# Patient Record
Sex: FEMALE | Race: NATIVE HAWAIIAN OR OTHER PACIFIC ISLANDER | NOT HISPANIC OR LATINO
[De-identification: names, ages, dates, MRNs, and addresses within clinical notes are randomized per-mention and may not be internally consistent; named-entity substitution may affect disease eponyms.]

---

## 2018-06-29 ENCOUNTER — TRANSCRIPTION ENCOUNTER (OUTPATIENT)
Age: 58
End: 2018-06-29

## 2021-02-06 ENCOUNTER — TRANSCRIPTION ENCOUNTER (OUTPATIENT)
Age: 61
End: 2021-02-06

## 2021-08-28 VITALS
WEIGHT: 77.16 LBS | DIASTOLIC BLOOD PRESSURE: 74 MMHG | HEART RATE: 73 BPM | RESPIRATION RATE: 19 BRPM | OXYGEN SATURATION: 100 % | SYSTOLIC BLOOD PRESSURE: 134 MMHG | TEMPERATURE: 98 F | HEIGHT: 60 IN

## 2021-08-28 LAB
ALBUMIN SERPL ELPH-MCNC: 4.4 G/DL — SIGNIFICANT CHANGE UP (ref 3.3–5)
ALP SERPL-CCNC: 35 U/L — LOW (ref 40–120)
ALT FLD-CCNC: 11 U/L — SIGNIFICANT CHANGE UP (ref 10–45)
ANION GAP SERPL CALC-SCNC: 7 MMOL/L — SIGNIFICANT CHANGE UP (ref 5–17)
APPEARANCE UR: CLEAR — SIGNIFICANT CHANGE UP
AST SERPL-CCNC: 17 U/L — SIGNIFICANT CHANGE UP (ref 10–40)
BACTERIA # UR AUTO: PRESENT /HPF
BASOPHILS # BLD AUTO: 0.06 K/UL — SIGNIFICANT CHANGE UP (ref 0–0.2)
BASOPHILS NFR BLD AUTO: 0.9 % — SIGNIFICANT CHANGE UP (ref 0–2)
BILIRUB SERPL-MCNC: 0.2 MG/DL — SIGNIFICANT CHANGE UP (ref 0.2–1.2)
BILIRUB UR-MCNC: NEGATIVE — SIGNIFICANT CHANGE UP
BUN SERPL-MCNC: 29 MG/DL — HIGH (ref 7–23)
CALCIUM SERPL-MCNC: 9.6 MG/DL — SIGNIFICANT CHANGE UP (ref 8.4–10.5)
CHLORIDE SERPL-SCNC: 106 MMOL/L — SIGNIFICANT CHANGE UP (ref 96–108)
CO2 SERPL-SCNC: 29 MMOL/L — SIGNIFICANT CHANGE UP (ref 22–31)
COD CRY URNS QL: ABNORMAL /HPF
COLOR SPEC: YELLOW — SIGNIFICANT CHANGE UP
CREAT SERPL-MCNC: 0.86 MG/DL — SIGNIFICANT CHANGE UP (ref 0.5–1.3)
DIFF PNL FLD: ABNORMAL
EOSINOPHIL # BLD AUTO: 0.16 K/UL — SIGNIFICANT CHANGE UP (ref 0–0.5)
EOSINOPHIL NFR BLD AUTO: 2.5 % — SIGNIFICANT CHANGE UP (ref 0–6)
EPI CELLS # UR: SIGNIFICANT CHANGE UP /HPF (ref 0–5)
ETHANOL SERPL-MCNC: <10 MG/DL — SIGNIFICANT CHANGE UP (ref 0–10)
GLUCOSE SERPL-MCNC: 108 MG/DL — HIGH (ref 70–99)
GLUCOSE UR QL: NEGATIVE — SIGNIFICANT CHANGE UP
HCT VFR BLD CALC: 39.4 % — SIGNIFICANT CHANGE UP (ref 34.5–45)
HGB BLD-MCNC: 12.8 G/DL — SIGNIFICANT CHANGE UP (ref 11.5–15.5)
IMM GRANULOCYTES NFR BLD AUTO: 0.3 % — SIGNIFICANT CHANGE UP (ref 0–1.5)
KETONES UR-MCNC: NEGATIVE — SIGNIFICANT CHANGE UP
LEUKOCYTE ESTERASE UR-ACNC: NEGATIVE — SIGNIFICANT CHANGE UP
LYMPHOCYTES # BLD AUTO: 2.06 K/UL — SIGNIFICANT CHANGE UP (ref 1–3.3)
LYMPHOCYTES # BLD AUTO: 31.9 % — SIGNIFICANT CHANGE UP (ref 13–44)
MCHC RBC-ENTMCNC: 31.4 PG — SIGNIFICANT CHANGE UP (ref 27–34)
MCHC RBC-ENTMCNC: 32.5 GM/DL — SIGNIFICANT CHANGE UP (ref 32–36)
MCV RBC AUTO: 96.8 FL — SIGNIFICANT CHANGE UP (ref 80–100)
MONOCYTES # BLD AUTO: 0.58 K/UL — SIGNIFICANT CHANGE UP (ref 0–0.9)
MONOCYTES NFR BLD AUTO: 9 % — SIGNIFICANT CHANGE UP (ref 2–14)
NEUTROPHILS # BLD AUTO: 3.57 K/UL — SIGNIFICANT CHANGE UP (ref 1.8–7.4)
NEUTROPHILS NFR BLD AUTO: 55.4 % — SIGNIFICANT CHANGE UP (ref 43–77)
NITRITE UR-MCNC: NEGATIVE — SIGNIFICANT CHANGE UP
NRBC # BLD: 0 /100 WBCS — SIGNIFICANT CHANGE UP (ref 0–0)
PH UR: 6.5 — SIGNIFICANT CHANGE UP (ref 5–8)
PLATELET # BLD AUTO: 293 K/UL — SIGNIFICANT CHANGE UP (ref 150–400)
POTASSIUM SERPL-MCNC: 4.3 MMOL/L — SIGNIFICANT CHANGE UP (ref 3.5–5.3)
POTASSIUM SERPL-SCNC: 4.3 MMOL/L — SIGNIFICANT CHANGE UP (ref 3.5–5.3)
PROT SERPL-MCNC: 6.6 G/DL — SIGNIFICANT CHANGE UP (ref 6–8.3)
PROT UR-MCNC: NEGATIVE MG/DL — SIGNIFICANT CHANGE UP
RBC # BLD: 4.07 M/UL — SIGNIFICANT CHANGE UP (ref 3.8–5.2)
RBC # FLD: 12.8 % — SIGNIFICANT CHANGE UP (ref 10.3–14.5)
RBC CASTS # UR COMP ASSIST: ABNORMAL /HPF
SODIUM SERPL-SCNC: 142 MMOL/L — SIGNIFICANT CHANGE UP (ref 135–145)
SP GR SPEC: 1.02 — SIGNIFICANT CHANGE UP (ref 1–1.03)
UROBILINOGEN FLD QL: 0.2 E.U./DL — SIGNIFICANT CHANGE UP
WBC # BLD: 6.45 K/UL — SIGNIFICANT CHANGE UP (ref 3.8–10.5)
WBC # FLD AUTO: 6.45 K/UL — SIGNIFICANT CHANGE UP (ref 3.8–10.5)
WBC UR QL: < 5 /HPF — SIGNIFICANT CHANGE UP

## 2021-08-28 PROCEDURE — 93010 ELECTROCARDIOGRAM REPORT: CPT | Mod: 59

## 2021-08-28 PROCEDURE — 99285 EMERGENCY DEPT VISIT HI MDM: CPT

## 2021-08-28 NOTE — ED ADULT TRIAGE NOTE - OTHER COMPLAINTS
CC of SI no specific plans denies HI x 2 mos gradually getting worst. no attempts in the past, sent by a Ashwini Sheridan

## 2021-08-28 NOTE — ED PROVIDER NOTE - CLINICAL SUMMARY MEDICAL DECISION MAKING FREE TEXT BOX
Pt is afebrile and hemodynamically stable. She has no medical complaints. Accompanied to ED by friend april who was concerned by severity of pt's depression. Pt reports passive SI, denies plan. Labs unremarkable. UDS and ETOH negative. COVID19 neg. Telepsych consulted and evaluated pt. Pt initially agreed to voluntary admit; however, when asked to complete paperwork, was hesitant to do so. Updated Telepsych who states they will obtain collateral from pt's friends/family in AM prior to final dispo. Pt is afebrile and hemodynamically stable. She has no medical complaints. Accompanied to ED by friend april who was concerned by severity of pt's depression. Pt reports passive SI, denies plan. Labs unremarkable. UDS and ETOH negative. COVID19 neg. Telepsych consulted and evaluated pt. Pt initially agreed to voluntary admit; however, when asked to complete paperwork, was hesitant to do so. Updated Telepsych who states they will obtain collateral from pt's friends/family in AM prior to final dispo.      Patient calm and cooperative. admitted for further psych management

## 2021-08-28 NOTE — ED PROVIDER NOTE - PROGRESS NOTE DETAILS
Renee - Pt requesting to hold off on voluntary admission paperwork. States she is not sure she wants admission. She is concerned about insurance coverage. Discussed this with tele psych who states pt should be placed on psych hold and psych to obtain collateral in am. Director - pt received from night team.  Pt resting comfortably, awaits psych re-eval this am.  VS, labs reviewed.  Pt medically clear for psych admit. Director - psych contacted - awaiting return call to discuss w pt's outpt psych; cont to await dispo.

## 2021-08-28 NOTE — ED ADULT NURSE NOTE - OBJECTIVE STATEMENT
Patient sent to the ED for evaluation for depression, SI, denies suicidal attempts, or plan, Or HI, patient endorsed that she has been having worsening depression, SI and has became non functional over the past 2 YRS.  Patient is alert and oriented, calm, placed on CO, undressed and wanded for safety.

## 2021-08-28 NOTE — ED ADULT NURSE NOTE - NSIMPLEMENTINTERV_GEN_ALL_ED
Implemented All Universal Safety Interventions:  [place] to call system. Call bell, personal items and telephone within reach. Instruct patient to call for assistance. Room bathroom lighting operational. Non-slip footwear when patient is off stretcher. Physically safe environment: no spills, clutter or unnecessary equipment. Stretcher in lowest position, wheels locked, appropriate side rails in place.

## 2021-08-28 NOTE — ED PROVIDER NOTE - TEMPLATE
Psych/Behavioral I have reviewed and confirmed nurses' notes for patient's medications, allergies, medical history, and surgical history.

## 2021-08-28 NOTE — ED PROVIDER NOTE - ATTENDING CONTRIBUTION TO CARE
62 y/o F pt w/ PMHx of anxiety and depression, takes Latuda and lamotrigine, presents to the ED with worsening depressive symptoms and passive suicidal thoughts over the past 2 weeks. States that she has been depressed for many years. For the past few months, symptoms have been worsening. Reports that 3 weeks ago that she was taken off lithium. States that over the past 2 weeks, she has had worsening anhedonia, decreased appetite, and withdrawn from friends and family. Admits to passive suicidal thoughts without a plan. Reports depression and suicidal thoughts in the context of financial stress. Denies homicidal ideations, auditory hallucinations, and drug and alcohol use. Patient lives alone and was brought into the ED by a friend Candy Ty ([phone]) who is concerned for her. Psychiatrist is Dr. Hedrick. Denies medical complaints. Fully vaccinated against COVID-19. No physical complaints  Limited PE performed in the setting of the COVID10 pandemic, in efforts to limit exposure and cross-contamination  Constitutional: Well appearing, awake, alert, oriented to person, place, time/situation and in no apparent distress.  ENMT: Airway patent.   Cardiac: Normal rate, regular rhythm.   Respiratory: No increased WOB, tachypnea, hypoxia, or accessory mm use. Pt speaks in full sentences.    Musculoskeletal: Range of motion is not limited  Neuro: Alert and oriented x 3, face symmetric and speech fluent. Nml gross motor movement, grossly non focal   Skin: Skin normal color for race, warm, dry and intact. No evidence of rash.  Psych: Alert and oriented to person, place, time/situation. cooperative, flat affect, int tearful, maintained on 1:1  EKG NSR @ 61 bpm, nml axis, nml intervals. no ST_T abn. QTc 398  SI / depression w/o medical complaints. 1:1 psych med clearance w/u, psych eval  Pt initially for voluntary admission, but hesitates 2/2 costs. Re-discussed w/ psych. Hold to AM for re-eval and when able to obtain further collateral info  Pt s/o night team IMTIAZ Duran / Carla pending psych dispo

## 2021-08-28 NOTE — ED PROVIDER NOTE - PHYSICAL EXAMINATION
VITAL SIGNS: I have reviewed nursing notes and confirm.  CONSTITUTIONAL: Thin appearing and in no acute distress.   SKIN:  warm and dry, no acute rash.   HEAD:  normocephalic, atraumatic.  EYES: PERRL, EOM intact; conjunctiva and sclera clear.  ENT: No nasal discharge; airway clear.   NECK: Supple; non tender.  CARD: S1, S2 normal; no murmurs, gallops, or rubs. Regular rate and rhythm.   RESP:  Clear to auscultation b/l, no wheezes, rales or rhonchi.  ABD: Normal bowel sounds; soft; non-distended; non-tender; no guarding/ rebound.  EXT: Normal ROM. No clubbing, cyanosis or edema. 2+ pulses to b/l ue/le.  NEURO: Alert, oriented, grossly unremarkable  PSYCH: Cooperative, flat affect, intermittently tearful.

## 2021-08-28 NOTE — ED PROVIDER NOTE - NS ED ROS FT
Constitutional: No fever. No chills.  Eyes: No redness. No discharge. No vision change.   ENT: No sore throat. No ear pain.  Cardiovascular: No chest pain. No leg swelling.  Respiratory: No cough. No shortness of breath.  GI: No abdominal pain. No vomiting. No diarrhea.   MSK: No joint pain. No back pain.   Skin: No rash. No abrasions.   Neuro: No numbness. No weakness.   Psych: Suicidal thoughts and depressive symptoms. No homicidal ideations, and no auditory hallucinations.

## 2021-08-28 NOTE — ED PROVIDER NOTE - OBJECTIVE STATEMENT
60 y/o F pt w/ PMHx of anxiety and depression, takes Latuda and lamotrigine, presents to the ED with worsening depressive symptoms and passive suicidal thoughts over the past 2 weeks. States that she has been depressed for many years. For the past few months, symptoms have been worsening. Reports that 3 weeks ago that she was taken off lithium. States that over the past 2 weeks, she has had worsening anhedonia, decreased appetite, and withdrawn from friends and family. Admits to passive suicidal thoughts without a plan. Reports depression and suicidal thoughts in the context of financial stress. Denies homicidal ideations, auditory hallucinations, and drug and alcohol use. Patient lives alone and was brought into the ED by a friend who is concerned for her. Psychiatrist is Dr. Hedrick. Denies medical complaints. Fully vaccinated against COVID-19. 60 y/o F pt w/ PMHx of anxiety and depression, takes Latuda and lamotrigine, presents to the ED with worsening depressive symptoms and passive suicidal thoughts over the past 2 weeks. States that she has been depressed for many years. For the past few months, symptoms have been worsening. Reports that 3 weeks ago that she was taken off lithium. States that over the past 2 weeks, she has had worsening anhedonia, decreased appetite, and withdrawn from friends and family. Admits to passive suicidal thoughts without a plan. Reports depression and suicidal thoughts in the context of financial stress. Denies homicidal ideations, auditory hallucinations, and drug and alcohol use. Patient lives alone and was brought into the ED by a friend Candy Ty ([phone]) who is concerned for her. Psychiatrist is Dr. Hedrick. Denies medical complaints. Fully vaccinated against COVID-19.

## 2021-08-29 ENCOUNTER — INPATIENT (INPATIENT)
Facility: HOSPITAL | Age: 61
LOS: 10 days | Discharge: HOME | DRG: 885 | End: 2021-09-09
Attending: PSYCHIATRY & NEUROLOGY | Admitting: PSYCHIATRY & NEUROLOGY
Payer: COMMERCIAL

## 2021-08-29 DIAGNOSIS — F33.2 MAJOR DEPRESSIVE DISORDER, RECURRENT SEVERE WITHOUT PSYCHOTIC FEATURES: ICD-10-CM

## 2021-08-29 LAB
AMPHET UR-MCNC: NEGATIVE — SIGNIFICANT CHANGE UP
BARBITURATES UR SCN-MCNC: NEGATIVE — SIGNIFICANT CHANGE UP
BENZODIAZ UR-MCNC: NEGATIVE — SIGNIFICANT CHANGE UP
COCAINE METAB.OTHER UR-MCNC: NEGATIVE — SIGNIFICANT CHANGE UP
METHADONE UR-MCNC: NEGATIVE — SIGNIFICANT CHANGE UP
OPIATES UR-MCNC: NEGATIVE — SIGNIFICANT CHANGE UP
PCP SPEC-MCNC: SIGNIFICANT CHANGE UP
PCP UR-MCNC: NEGATIVE — SIGNIFICANT CHANGE UP
SARS-COV-2 RNA SPEC QL NAA+PROBE: SIGNIFICANT CHANGE UP
THC UR QL: NEGATIVE — SIGNIFICANT CHANGE UP

## 2021-08-29 PROCEDURE — 90792 PSYCH DIAG EVAL W/MED SRVCS: CPT

## 2021-08-29 PROCEDURE — 90792 PSYCH DIAG EVAL W/MED SRVCS: CPT | Mod: 95

## 2021-08-29 RX ORDER — LURASIDONE HYDROCHLORIDE 40 MG/1
60 TABLET ORAL AT BEDTIME
Refills: 0 | Status: DISCONTINUED | OUTPATIENT
Start: 2021-08-29 | End: 2021-08-29

## 2021-08-29 RX ORDER — LAMOTRIGINE 25 MG/1
200 TABLET, ORALLY DISINTEGRATING ORAL ONCE
Refills: 0 | Status: DISCONTINUED | OUTPATIENT
Start: 2021-08-29 | End: 2021-08-29

## 2021-08-29 RX ORDER — LANOLIN ALCOHOL/MO/W.PET/CERES
3 CREAM (GRAM) TOPICAL AT BEDTIME
Refills: 0 | Status: DISCONTINUED | OUTPATIENT
Start: 2021-08-29 | End: 2021-09-09

## 2021-08-29 RX ORDER — LURASIDONE HYDROCHLORIDE 40 MG/1
60 TABLET ORAL EVERY 24 HOURS
Refills: 0 | Status: DISCONTINUED | OUTPATIENT
Start: 2021-08-29 | End: 2021-08-30

## 2021-08-29 RX ORDER — ACETAMINOPHEN 500 MG
650 TABLET ORAL EVERY 6 HOURS
Refills: 0 | Status: DISCONTINUED | OUTPATIENT
Start: 2021-08-29 | End: 2021-09-09

## 2021-08-29 RX ORDER — LAMOTRIGINE 25 MG/1
100 TABLET, ORALLY DISINTEGRATING ORAL EVERY 12 HOURS
Refills: 0 | Status: DISCONTINUED | OUTPATIENT
Start: 2021-08-29 | End: 2021-09-09

## 2021-08-29 RX ORDER — PETROLATUM,WHITE
1 JELLY (GRAM) TOPICAL ONCE
Refills: 0 | Status: COMPLETED | OUTPATIENT
Start: 2021-08-29 | End: 2021-08-29

## 2021-08-29 RX ORDER — LAMOTRIGINE 25 MG/1
100 TABLET, ORALLY DISINTEGRATING ORAL ONCE
Refills: 0 | Status: COMPLETED | OUTPATIENT
Start: 2021-08-29 | End: 2021-08-29

## 2021-08-29 RX ORDER — QUETIAPINE FUMARATE 200 MG/1
25 TABLET, FILM COATED ORAL EVERY 6 HOURS
Refills: 0 | Status: DISCONTINUED | OUTPATIENT
Start: 2021-08-29 | End: 2021-09-09

## 2021-08-29 RX ORDER — HYDROXYZINE HCL 10 MG
50 TABLET ORAL EVERY 24 HOURS
Refills: 0 | Status: DISCONTINUED | OUTPATIENT
Start: 2021-08-29 | End: 2021-08-30

## 2021-08-29 RX ADMIN — Medication 0.5 MILLIGRAM(S): at 03:11

## 2021-08-29 RX ADMIN — LAMOTRIGINE 100 MILLIGRAM(S): 25 TABLET, ORALLY DISINTEGRATING ORAL at 14:09

## 2021-08-29 RX ADMIN — Medication 3 MILLIGRAM(S): at 21:41

## 2021-08-29 RX ADMIN — Medication 1 APPLICATION(S): at 02:01

## 2021-08-29 RX ADMIN — LAMOTRIGINE 100 MILLIGRAM(S): 25 TABLET, ORALLY DISINTEGRATING ORAL at 21:41

## 2021-08-29 NOTE — ED BEHAVIORAL HEALTH ASSESSMENT NOTE - OTHER PAST PSYCHIATRIC HISTORY (INCLUDE DETAILS REGARDING ONSET, COURSE OF ILLNESS, INPATIENT/OUTPATIENT TREATMENT)
No suicide attempts or hospitalizatoins  States that she has had a couple of impulsive periods in her life so while she denies jay jay manic or hypomanic episodes one of her doctors felt that she would better be treated as bipolar depression. States that Dr. Joshi started her on lithium and she had a consultation with Dr. Vivian Pierre at [facility] who started her on latuda

## 2021-08-29 NOTE — ED BEHAVIORAL HEALTH ASSESSMENT NOTE - RISK ASSESSMENT
Risk factors include worsening suicide ideation, ongoing stressor, hopelessness, anhedonia, recent change in medications, social isolation. Protective factors include lack of prior suicide attempts, lack of access to firearms, lack of psychosis, sobriety, residential stability, treatment seeking behavior, supportive friend High Acute Suicide Risk

## 2021-08-29 NOTE — ED BEHAVIORAL HEALTH ASSESSMENT NOTE - PSYCHIATRIC ISSUES AND PLAN (INCLUDE STANDING AND PRN MEDICATION)
Continue latuda 60mg PO qHS and lamictal 100mg PO BID. Obtain collateral from psychiatrist Dr. Foster at [phone] and Dr. Joshi at [phone]. Obtain collateral from friend April. For anxiety, ativan 0.5mg PO q4h PRN. For agitation, haldol 2mg with ativan 1mg and cogentin 0.5mg PO or IM if severe

## 2021-08-29 NOTE — ED BEHAVIORAL HEALTH NOTE - BEHAVIORAL HEALTH NOTE
PROGRESS NOTE    ID: This is a 61 year-old female, domiciled in [facility], single, no dependents, has pet (with friend), previously worked at art gallery, unemployed and various financial stressors currently, PPHx of depression (bipolar versus unipolar), multiple antidepressant trials and more recently on lamotrigine 100mg bid, lurasidone 60mg qhs, and Lithium 300mg for the past 6 months, Lithium dc’ed 3 weeks ago (although pt and psychiatrist note minimal benefit with Lithium for suicidality or otherwise), no previous psychiatric hospitalizations, engaged in outpatient care (DBT individual and group, psychiatrist for med management (new and seeing Dr. Foster since May 2021), h/o ketamine treatments with reportedly no benefit, no pSA, h/o intermittent passive suicidality, family h/o suspected completed suicide in great grandfather, no known family h/o BPAD, SCZ,  no h/o aggression/violence, no legal history, denies substance use hx but friend reports remote h/o alcohol use, no current substance use and no c/f acute etoh/benzo wd, no PMH, who was BIBS and friend for worsening depression, decline in functioning, and passive SI in context of various psychosocial stressors including finances.     Interval Events:  On reassessment, patient is slowed, withdrawn, and presents depressed with indecisiveness and difficulty concentrating. Patient endorses passive SI and notes that she has struggled to function with difficulty completing daily tasks, difficulty concentrating, anergia, amotivation, anhedonia, and increased passive suicidality (no thoughts of plan or h/o pSA). Patient attributes worsening depression to financial stressors and feeling isolated with no family and few friends. Patient shares h/o depressive symptoms and multiple medication trials and ketamine treatments in the past with limited benefit. Previous therapist Dr. Joshi believed patient to do better on Lithium 300mg qhs (in combo with current regimen of lamotrigine 100mg bid and lurasidone 60mg qhs) but patient and current psychiatrist Dr. Foster report limited benefit and progressive decline in mood and functioning even when taking low-dose Lithium (dc’ed 3 weeks ago). Previous therapist believed patient to struggle with bipolar depression (h/o impulsivity and increased spending briefly) but no distinct manic or hypomanic episodes and past history concerning for borderline personality disorder. Patient had difficulty affording care with therapist Dr. Johsi and transitioned to new program for DBT (individual and group) and patient notes difficulty engaging in these virtual therapies due to depressive symptoms. Current psychiatrist is concerned for superimposed depression on BPD but notes patient is new to her (followed since 5/2021) and is “barely functioning at all.”     With further discussion, patient interested in voluntary psychiatric admission with insight into worsening depressive symptoms and decline in functioning. Patient completed legals and discussed continuing lamotrigine and lurasidone at this time and potential to consider different agent (?SSRI/SNRI to target possible unipolar depression) superimposed on BPD. r/o mood disorder with psychoses (patient perseverative around finances but no overt delusional content elicited on reassessment).    Vitals: wnl  MSE:   General: ASA, thin  woman sitting upright in hospital bed in ED anxious appearing  Speech: RRVP   Mood: "terrible"  Affect: constricted  TP: linear  TC: no overt delusions   Perception: denies AVH   SI/HI: endorses passive SI, denies HI  I/J: poor/poor  Cognition: AAOx3, no addn'l assessment    Labs: unremarkable    A/P:      Collateral Contacts:  -psychiatrist Dr. Foster #[phone], mobile#[phone]  -recent therapist   -current DBT therapist Dr. Glover PROGRESS NOTE    ID: This is a 61 year-old female, domiciled in [facility], single, no dependents, has pet (with friend), previously worked at art gallery, unemployed and various financial stressors currently, PPHx of depression (bipolar versus unipolar), multiple antidepressant trials and more recently on lamotrigine 100mg bid, lurasidone 60mg qhs, and Lithium 300mg for the past 6 months, Lithium dc’ed 3 weeks ago (although pt and psychiatrist note minimal benefit with Lithium for suicidality or otherwise), no previous psychiatric hospitalizations, engaged in outpatient care (DBT individual and group, psychiatrist for med management (new and seeing Dr. Foster since May 2021), h/o ketamine treatments with reportedly no benefit, no pSA, h/o intermittent passive suicidality, family h/o suspected completed suicide in great grandfather, no known family h/o BPAD, SCZ,  no h/o aggression/violence, no legal history, denies substance use hx but friend reports remote h/o alcohol use, no current substance use and no c/f acute etoh/benzo wd, no PMH, who was BIBS and friend for worsening depression, decline in functioning, and passive SI in context of various psychosocial stressors including finances.     Interval Events:  On reassessment, patient is slowed, withdrawn, and presents depressed with indecisiveness and difficulty concentrating. Patient endorses passive SI and notes that she has struggled to function with difficulty completing daily tasks, difficulty concentrating, anergia, amotivation, anhedonia, and increased passive suicidality (no thoughts of plan or h/o pSA). Patient attributes worsening depression to financial stressors and feeling isolated with no family and few friends. Patient shares h/o depressive symptoms and multiple medication trials and ketamine treatments in the past with limited benefit. Previous therapist Dr. Joshi believed patient to do better on Lithium 300mg qhs (in combo with current regimen of lamotrigine 100mg bid and lurasidone 60mg qhs) but patient and current psychiatrist Dr. Foster report limited benefit and progressive decline in mood and functioning even when taking low-dose Lithium (dc’ed 3 weeks ago). Previous therapist believed patient to struggle with bipolar depression (h/o impulsivity and increased spending briefly) but no distinct manic or hypomanic episodes and past history concerning for borderline personality disorder. Patient had difficulty affording care with therapist Dr. Joshi and transitioned to new program for DBT (individual and group) and patient notes difficulty engaging in these virtual therapies due to depressive symptoms. Current psychiatrist is concerned for superimposed depression on BPD but notes patient is new to her (followed since 5/2021) and is “barely functioning at all.”     With further discussion, patient interested in voluntary psychiatric admission with insight into worsening depressive symptoms and decline in functioning. Patient completed legals and discussed continuing lamotrigine and lurasidone at this time and potential to consider different agent (?SSRI/SNRI to target possible unipolar depression) superimposed on BPD. r/o mood disorder with psychoses (patient perseverative around finances but no overt delusional content elicited on reassessment).    Vitals: wnl  MSE:   General: ASA, thin  woman sitting upright in hospital bed in ED anxious appearing  Speech: RRVP   Mood: "terrible"  Affect: constricted  TP: linear  TC: no overt delusions   Perception: denies AVH   SI/HI: endorses passive SI, denies HI  I/J: poor/poor  Cognition: AAOx3, no addn'l assessment    Labs: unremarkable    A/P: This is a 61 year-old domiciled female who is currently unemployed with financial stressors and limited social supports who has past psychiatric history of suspected borderline personality disorder, bipolar versus unipolar depression, no previous psychiatric hospitalizations, no history of suicide attempts but h/o passive SI often in context of acute stressors, h/o impulsivity (not clearly described as within distinct hypomanic episode) no h/o distinct acute jeanette or psychoses, no known family h/o BPAD, suspected completed suicide in great grandfather, who presented to ER with friend in context of worsening depressive symptoms and passive SI. Outside treaters are concerned and both share history of patient declining with worsening depressive symptoms (low mood, recently decreased appetite, weight loss, anergia, difficulty concentrating, amotivation, anhedonia, hopelessness and increased thoughts of passive SI ("well if I cannot resolve my financial issues, then I can always just kill myself...I mean...this is not getting any better."). Initial impression is patient has superimposed depression on borderline personality disorder based on history from therapists but unclear if unipolar versus bipolar. Patient worsened while on lamotrigine, lurasidone, and Lithium (300mg) which may suggest undertreated unipolar major depressive episode although may also be that patient requires higher dosing of Lithium for mood stabilization. Patient perseverative about finances and has difficulty organizing her thoughts (indecisiveness i/c/o depression) and no overt delusional content elicited on evaluation but r/o psychotic features on further diagnostic evaluation. Previous SSRI/SNRI and mood stabilizer trials reportedly ineffective but may have been when BPD pathology more prominent and no MDE during those trials. Admit to inpatient psychiatric unit for stabilization, no 1:1 indicated while on unit. Gather collateral around previous medication trials and reassess current medication regimen and if an SSRI trial warranted/potentially beneficial (versus higher dosing of Lithium to target mood stability).     PLAN:  -vol admit  -lamotrigine 200mg qd  -lurasidone 60mg qhs  -expand collateral and hx re. previous med regimens    Collateral Contacts:  -psychiatrist Dr. Foster #[phone], mobile#[phone]  -recent therapist Dr. Joshi [phone]  -current DBT therapist Dr. Yefri Glover  -friend (BIB this friend to ER and collateral in previous note) April Ty [phone] PROGRESS NOTE    ID: This is a 61 year-old female, domiciled in [facility], single, no dependents, has pet (with friend), previously worked at art gallery, unemployed and various financial stressors currently, PPHx of depression (bipolar versus unipolar), multiple antidepressant trials and more recently on lamotrigine 100mg bid, lurasidone 60mg qhs, and Lithium 300mg for the past 6 months, Lithium dc’ed 3 weeks ago (although pt and psychiatrist note minimal benefit with Lithium for suicidality or otherwise), no previous psychiatric hospitalizations, engaged in outpatient care (DBT individual and group, psychiatrist for med management (new and seeing Dr. Foster since May 2021), h/o ketamine treatments with reportedly no benefit, no pSA, h/o intermittent passive suicidality, family h/o suspected completed suicide in great grandfather, no known family h/o BPAD, SCZ,  no h/o aggression/violence, no legal history, denies substance use hx but friend reports remote h/o alcohol use, no current substance use and no c/f acute etoh/benzo wd, no PMH, who was BIBS and friend for worsening depression, decline in functioning, and passive SI in context of various psychosocial stressors including finances.     Interval Events:  On reassessment, patient is slowed, withdrawn, and presents depressed with indecisiveness and difficulty concentrating. Patient endorses passive SI and notes that she has struggled to function with difficulty completing daily tasks, difficulty concentrating, anergia, amotivation, anhedonia, and increased passive suicidality (no thoughts of plan or h/o pSA). Patient attributes worsening depression to financial stressors and feeling isolated with no family and few friends. Patient shares h/o depressive symptoms and multiple medication trials and ketamine treatments in the past with limited benefit. Previous therapist Dr. Joshi believed patient to do better on Lithium 300mg qhs (in combo with current regimen of lamotrigine 100mg bid and lurasidone 60mg qhs) but patient and current psychiatrist Dr. Foster report limited benefit and progressive decline in mood and functioning even when taking low-dose Lithium (dc’ed 3 weeks ago). Previous therapist believed patient to struggle with bipolar depression (h/o impulsivity and increased spending briefly) but no distinct manic or hypomanic episodes and past history concerning for borderline personality disorder. Patient had difficulty affording care with therapist Dr. Joshi and transitioned to new program for DBT (individual and group) and patient notes difficulty engaging in these virtual therapies due to depressive symptoms. Current psychiatrist is concerned for superimposed depression on BPD but notes patient is new to her (followed since 5/2021) and is “barely functioning at all.”     With further discussion, patient interested in voluntary psychiatric admission with insight into worsening depressive symptoms and decline in functioning. Patient completed legals and discussed continuing lamotrigine and lurasidone at this time and potential to consider different agent (?SSRI/SNRI to target possible unipolar depression) superimposed on BPD. r/o mood disorder with psychoses (patient perseverative around finances but no overt delusional content elicited on reassessment).    Vitals: wnl  MSE:   General: ASA, thin  woman sitting upright in hospital bed in ED anxious appearing  Speech: RRVP   Mood: "terrible"  Affect: constricted  TP: linear  TC: no overt delusions   Perception: denies AVH   SI/HI: endorses passive SI, denies HI  I/J: poor/poor  Cognition: AAOx3, no addn'l assessment    Labs: unremarkable    A/P: This is a 61 year-old domiciled female who is currently unemployed with financial stressors and limited social supports who has past psychiatric history of suspected borderline personality disorder, bipolar versus unipolar depression, no previous psychiatric hospitalizations, no history of suicide attempts but h/o passive SI often in context of acute stressors, h/o impulsivity (not clearly described as within distinct hypomanic episode) no h/o distinct acute jeanette or psychoses, no known family h/o BPAD, suspected completed suicide in great grandfather, who presented to ER with friend in context of worsening depressive symptoms and passive SI. Outside treaters are concerned and both share history of patient declining with worsening depressive symptoms (low mood, recently decreased appetite, weight loss, anergia, difficulty concentrating, amotivation, anhedonia, hopelessness and increased thoughts of passive SI ("well if I cannot resolve my financial issues, then I can always just kill myself...I mean...this is not getting any better."). Initial impression is patient has superimposed depression on borderline personality disorder based on history from therapists but unclear if unipolar versus bipolar. Patient worsened while on lamotrigine, lurasidone, and Lithium (300mg) which may suggest undertreated unipolar major depressive episode although may also be that patient requires higher dosing of Lithium for mood stabilization. Patient perseverative about finances and has difficulty organizing her thoughts (indecisiveness i/c/o depression) and no overt delusional content elicited on evaluation but r/o psychotic features on further diagnostic evaluation. Previous SSRI/SNRI and mood stabilizer trials reportedly ineffective but may have been when BPD pathology more prominent and no MDE during those trials. Admit to inpatient psychiatric unit for stabilization, no 1:1 indicated while on unit. Gather collateral around previous medication trials and reassess current medication regimen and if an SSRI trial warranted/potentially beneficial (versus higher dosing of Lithium to target mood stability).     PLAN:  -vol admit  -lamotrigine 200mg qd  -lurasidone 60mg qhs  -for agitation/restlessness: PRN quetiapine 25mg q6h and lorazepam 1mg q6h (limit lorazepam dosing if requiring given AEs in elderly pt   -PRN melatonin for insomnia  -PRN hydroxyzine 50mg qd for anxiety, limit dosing given anticholinergic effects in elderly pt   -expand collateral and hx re. previous med regimens    Collateral Contacts:  -psychiatrist Dr. Foster #[phone], mobile#[phone]  -recent therapist Dr. Joshi [phone]  -current DBT therapist Dr. Yefri Glover  -friend (BIB this friend to ER and collateral in previous note) April Ty [phone]

## 2021-08-29 NOTE — ED BEHAVIORAL HEALTH ASSESSMENT NOTE - HPI (INCLUDE ILLNESS QUALITY, SEVERITY, DURATION, TIMING, CONTEXT, MODIFYING FACTORS, ASSOCIATED SIGNS AND SYMPTOMS)
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 She reports low mood, inability to enjoy herself, cannot even bring herself to watch tv, feeling increasingly hopeless about her financial situation, with low energy, 7lb weight loss in the last 3 weeks (is 4'11", 77lbs currently, BMI 15.6), increasing isolation, withdrawal from any social contacts, and worsening suicide ideation stating that if she cannot figure out her financial situation she will have to kill herself. She states that she has had some thoughts about how she would do this which come into her mind briefly but she did not clarify and states that she is too coward to try, but notes that she has been thinking more about them. She states that generally sleep has been okay though she did not sleep last night. She states that she had switched from her longer term psychiatrist/therapist Dr. Joshi to a DBT therapist/psychiatrist Dr. Foster. She states that she had been taking latuda, lamictal, and lithium, but felt overmedicated and told Dr. Foster and stopped the lithium several weeks ago. She states that she spoke to Dr. Joshi recently who recommended restarting lithium at a lower dose, but she did this once and felt too sedated so she stopped. She states that last night she forgot to take her latuda so she took it in the morning, and stated that today she felt catatonic, in that she went to [facility] with her friend and could barely engage, did not feel present, could barely think, and so friend brought her to the ED. She states that her therapist has been suggesting hospitalization for some time, as has her friend, but patient states that she has resisted because she is scared and it is embarassing, though she acknowledges that her symptoms are all worsening and outpatient treatment has not worked, so she is open to coming into the hospital at this point. 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 She reports low mood, inability to enjoy herself, cannot even bring herself to watch tv, feeling increasingly hopeless about her financial situation, with low energy, 7lb weight loss in the last 3 weeks (is 4'11", 77lbs currently, BMI 15.6), increasing isolation, withdrawal from any social contacts, and worsening suicide ideation stating that if she cannot figure out her financial situation she will have to kill herself. She states that she has had some thoughts about how she would do this which come into her mind briefly but she did not clarify and states that she is too coward to try, but notes that she has been thinking more about them. She states that generally sleep has been okay though she did not sleep last night. She states that she had switched from her longer term psychiatrist/therapist Dr. Joshi to a DBT therapist/psychiatrist Dr. Foster. She states that she had been taking latuda, lamictal, and lithium, but felt overmedicated and told Dr. Foster and stopped the lithium several weeks ago. She states that she spoke to Dr. Joshi recently who recommended restarting lithium at a lower dose, but she did this once and felt too sedated so she stopped. She states that last night she forgot to take her latuda so she took it in the morning, and stated that today she felt catatonic, in that she went to [facility] with her friend and could barely engage, did not feel present, could barely think, and so friend brought her to the ED. She states that her therapist has been suggesting hospitalization for some time, as has her friend, but patient states that she has resisted because she is scared and it is embarassing, though she acknowledges that her symptoms are all worsening and outpatient treatment has not worked, so she is open to coming into the hospital at this point.    Regarding her weight, she states that she eats throughout the day but feels that she might be eating less per meal at this point, did not quantify, denies intentionally trying to lose weight, states that she feels it is due to her stress

## 2021-08-29 NOTE — ED BEHAVIORAL HEALTH ASSESSMENT NOTE - ATTENTION / CONCENTRATION
Bedside and Verbal shift change report given to Salbador Hankins RN (oncoming nurse) by Romero Logan (offgoing nurse). Report included the following information SBAR, Kardex, MAR and Recent Results. SITUATION:  Code Status: Full Code  Reason for Admission: Hyperkalemia  Hyperkalemia  Renal failure  SCOOTER (acute kidney injury) [facility])  Hospital day: 5  Problem List:       Hospital Problems  Never Reviewed          Codes Class Noted POA    Hyperkalemia ICD-10-CM: E87.5  ICD-9-CM: 276.7  9/8/2017 Unknown        Renal failure ICD-10-CM: N19  ICD-9-CM: 651  9/8/2017 Unknown        SCOOTER (acute kidney injury) ([facility] [address] ICD-10-CM: N17.9  ICD-9-CM: 584.9  9/8/2017 Unknown              BACKGROUND:   Past Medical History:   Past Medical History:   Diagnosis Date    Chronic kidney disease     ESRD (end stage renal disease) ([facility] [address]     Hypercholesteremia     Hypertension     Kidney disease     Vitamin D deficiency       Patient taking anticoagulants yes    Patient has a defibrillator: no     ASSESSMENT:  Changes in Assessment Throughout Shift:   Patient alert and oriented x's 4. Vitals stable, on RA, in NSR/ST, fever this morning (tylenol given). Percocet given for pain. Swollen left knee, fluid sent to lab (probable gout). Eating well. Voiding adequately. No BM today. Plans for dialysis tomorrow. Will continue to monitor.      Significant Changes in 24 hours (for example, RR/code, fall)  Patient has Central Line: yes - dialysis   Patient has Onofre Cath: no   Mobility Issues  PT  IV Patency  OR Checklist  Pending Tests    Last Vitals:  Vitals w/ MEWS Score (last day)     Date/Time MEWS Score Pulse Resp Temp BP Level of Consciousness SpO2    09/13/17 0400 1 90 18 98.2 °F (36.8 °C) 132/80 Alert 93 %    09/13/17 0000 1 89 18 98.3 °F (36.8 °C) 131/80 Alert 92 %    09/12/17 2000 1 93 18 99.4 °F (37.4 °C) 135/82 Alert 95 %    09/12/17 1509 1 98 18 98.6 °F (37 °C) 132/86 Alert 98 %    09/12/17 1121 1 97 18 (!)  100.6 °F (38.1 °C) 149/85 Alert 95 %    09/12/17 0718 2 (!)  103 18 (!)  100.9 °F (38.3 °C) (!)  148/92 Alert 93 %    09/12/17 0351 1 100 18 99.2 °F (37.3 °C) 137/87 Alert 94 %            PAIN    Pain Assessment    Pain Intensity 1: (P) 4 (09/13/17 0502)    Pain Location 1: (P) Knee    Pain Intervention(s) 1: (P) Medication (see MAR)    Patient Stated Pain Goal: (P) 0  Intervention effective: yes    Last 3 Weights:  Last 3 Recorded Weights in this Encounter    09/08/17 1603 09/10/17 0723   Weight: 47.6 kg (105 lb) 57 kg (125 lb 9.6 oz)   Weight change:     INTAKE/OUPUT    Current Shift:      Last three shifts: 09/11 0701 - 09/12 1900  In: 5 [P.O.:420]  Out: 300 [Urine:300]    RECOMMENDATIONS AND DISCHARGE PLANNING  Patient needs and requests: NA    Pending tests/procedures: NA     Discharge plan for patient: to be determined     Discharge planning Needs or Barriers: NA    Estimated Discharge Date: TO BE DETERMINED Posted on Whiteboard in Patients Room: no       \"HEALS\" SAFETY CHECK  A safety check occurred in the patient's room between off going nurse and oncoming nurse listed above. The safety check included the below items:    H  High Alert Medications Verify all high alert medication drips (heparin, PCA, etc.)  E  Equipment Suction is set up for ALL patients (with yanker)  Red plugs utilized for all equipment (IV pumps, etc.)  WOWs wiped down at end of shift. Room stocked with oxygen, suction, and other unit-specific supplies  A  Alarms Bed alarm is set for fall risk patients  Ensure chair alarm is in place and activated if patient is up in a chair  L  Lines Check IV for any infiltration  Onofre bag is empty if patient has a Onofre   Tubing and IV bags are labeled  S  Safety  Room is clean, patient is clean, and equipment is clean. Hallways are clear from equipment besides carts.    Fall bracelet on for fall risk patients  Ensure room is clear and free of clutter  Suction is set up for ALL patients (with yanker)  Hallways are clear from equipment besides carts.    Isolation precautions followed, supplies available outside room, sign posted    Felicitas L Blair [place] [facility]

## 2021-08-29 NOTE — ED BEHAVIORAL HEALTH ASSESSMENT NOTE - AXIS III
Alejandro Go is a 59year old female  Patient presents with:  Derm Problem: possible rectal lesion, recurring, not sure if presently with; FLU shot wanted, tdap due      HPI:   She told her GI doctor that she thought she might have herpes near her rec Location: Left arm, Patient Position: Sitting, Cuff Size: adult)   Pulse 72   Temp 98.4 °F (36.9 °C) (Oral)   Resp 16   Ht 67.13\"   Wt 192 lb   BMI 29.96 kg/m²   GENERAL: well developed, well nourished,in no apparent distress  SKIN: and perianal area- com Underweight

## 2021-08-29 NOTE — ED BEHAVIORAL HEALTH ASSESSMENT NOTE - DESCRIPTION
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

## 2021-08-29 NOTE — ED ADULT NURSE REASSESSMENT NOTE - NS ED NURSE REASSESS COMMENT FT1
Received patient resting in bed at this time in no acute distress. Patient refuses breakfast at this time stating "It's too early. Maybe a little later." Patient remains on 1:1 constant observation, pending disposition.
pt and belongings wanded by security
pt received, sitting up comfortably in bed, A&O x 3. No distress noted. 1:1 observation in place- belongings secured.

## 2021-08-29 NOTE — ED BEHAVIORAL HEALTH ASSESSMENT NOTE - DIFFERENTIAL
MDD, recurrent, severe w/o psychosis, r/o bipolar disorder, eating disorder, mood disorder secondary to general medical condition (underweight, other unknown)

## 2021-08-29 NOTE — ED BEHAVIORAL HEALTH ASSESSMENT NOTE - SUMMARY
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 As such, she appears to be at high risk of suicide and would benefit from inpatient hospitalization for safety, stabilization, and medication titration. Would benefit from inpatient team speaking to both providers as patient has been consulting with two different providers and current medication regimen does not appear to be working

## 2021-08-29 NOTE — BH CHART NOTE - NSEVENTNOTEFT_PSY_ALL_CORE
Patient on 8Uris in behavioral control. Requesting lamotrigine dosing to be split to 100mg bid instead of once daily dosing.  No other acute complaints or concerns at this time.

## 2021-08-29 NOTE — ED BEHAVIORAL HEALTH ASSESSMENT NOTE - DETAILS
Message left with Candy Ty ([phone]) Discussed with ED attending Would not impact clinical decision making at this time See HPI

## 2021-08-29 NOTE — ED BEHAVIORAL HEALTH NOTE - BEHAVIORAL HEALTH NOTE
Called and spoke with friend Candy Ty at [phone]: April states that patient hasn't been doing well for some time but in the last 2 months she had seemed overmedicated, slurring her words, friend were concerned. She was intermittently crying, calling april saying that she cannot go on like that. Her psychiatrist took her off of lithium and she seemes to have been doing worse. April had her brother who is a psychiatrist speak to patient on friday night to see if he thought she needed to come to the hospital or was safe, and he felt thatshe seemed okay overnight and if she felt like going to [place] with april she should be fine, so they went, but patient seemed catatonic, barely interacting. She skipped her medications at dinner and then in the morning was just sitting and staring at the wall with a confused look in her eye, didn't sleep at all, would get into panics about whether or not she should come home. They called Dr. Foster who told patient to come to the hospital. Per April patient has always been very thin but recently seems to have lost more weight. As far as April knows patient has never tried to kill herself but has had suicide ideation in the past. She used to go to  with april but stopped going about 6mo to a year before covid, stating that she wasn't an acoholic and felt like a hypocrit going there. April states that she tried to convince patient to stick around for the community but patient has completely withdrawn. She states that april has also tried ketamine in the past but did not know if it helped. States that patient has always been very indecisive but especially recently has a hard time making decisions. States that she is not sure whether or not patient is safe going home, feels that she should not with her current medication regimen. April has patient's dog at the moment.     Called and left message requesting call back with Dr. Foster at [phone] and Dr. Joshi at [phone].    Per ED attending, patient is now unsure if she wants to come into the hospital as she found out that she would be responsible for half of the bill. Given this, will hold patient in the ED for collateral from outpatient treatment team to see if they feel that it would be safe to continue treating her as an outpatient.

## 2021-08-30 DIAGNOSIS — F60.3 BORDERLINE PERSONALITY DISORDER: ICD-10-CM

## 2021-08-30 LAB
A1C WITH ESTIMATED AVERAGE GLUCOSE RESULT: 5.4 % — SIGNIFICANT CHANGE UP (ref 4–5.6)
CHOLEST SERPL-MCNC: 231 MG/DL — HIGH
COVID-19 SPIKE DOMAIN AB INTERP: POSITIVE
COVID-19 SPIKE DOMAIN ANTIBODY RESULT: >250 U/ML — HIGH
ESTIMATED AVERAGE GLUCOSE: 108 MG/DL — SIGNIFICANT CHANGE UP (ref 68–114)
HDLC SERPL-MCNC: 107 MG/DL — SIGNIFICANT CHANGE UP
LIPID PNL WITH DIRECT LDL SERPL: 112 MG/DL — HIGH
NON HDL CHOLESTEROL: 124 MG/DL — SIGNIFICANT CHANGE UP
SARS-COV-2 IGG+IGM SERPL QL IA: >250 U/ML — HIGH
SARS-COV-2 IGG+IGM SERPL QL IA: POSITIVE
TRIGL SERPL-MCNC: 62 MG/DL — SIGNIFICANT CHANGE UP

## 2021-08-30 PROCEDURE — 90853 GROUP PSYCHOTHERAPY: CPT

## 2021-08-30 RX ORDER — LITHIUM CARBONATE 300 MG/1
300 TABLET, EXTENDED RELEASE ORAL AT BEDTIME
Refills: 0 | Status: DISCONTINUED | OUTPATIENT
Start: 2021-08-30 | End: 2021-09-02

## 2021-08-30 RX ORDER — LURASIDONE HYDROCHLORIDE 40 MG/1
40 TABLET ORAL
Refills: 0 | Status: DISCONTINUED | OUTPATIENT
Start: 2021-08-30 | End: 2021-08-30

## 2021-08-30 RX ADMIN — LITHIUM CARBONATE 300 MILLIGRAM(S): 300 TABLET, EXTENDED RELEASE ORAL at 21:39

## 2021-08-30 RX ADMIN — LAMOTRIGINE 100 MILLIGRAM(S): 25 TABLET, ORALLY DISINTEGRATING ORAL at 11:42

## 2021-08-30 RX ADMIN — Medication 1 MILLIGRAM(S): at 19:52

## 2021-08-30 RX ADMIN — LAMOTRIGINE 100 MILLIGRAM(S): 25 TABLET, ORALLY DISINTEGRATING ORAL at 21:39

## 2021-08-30 RX ADMIN — Medication 1 TABLET(S): at 11:41

## 2021-08-30 NOTE — BH INPATIENT PSYCHIATRY ASSESSMENT NOTE - NSCOMMENTSUICRISKFACT_PSY_ALL_CORE
Moderate to high based on history of active suicidal ideation without intent or plan, mood disorder, history of impulsivity, financial stressors and lack of social supports. Protective factors include stable housing, access to outpatient services, in specialized DBT program.

## 2021-08-30 NOTE — DIETITIAN NUTRITION RISK NOTIFICATION - INADEQUATE ENERGY INTAKE
----- Message from Yuly Crane sent at 5/4/2018  8:26 AM CDT -----  Contact: pt  Call pt at [phone] or [phone]  Pt is needing a refill on [id number] [place]  [address] AT [facility] [address]  [address] [address]  Phone: [phone] Fax: [phone]       </= 50% for >/= 5 days

## 2021-08-30 NOTE — BH INPATIENT PSYCHIATRY ASSESSMENT NOTE - MODIFICATIONS
continue current meds; will need psychological assessment.  monitor Lithium levels; may need time to reach steady state and clinical response.

## 2021-08-30 NOTE — BH INPATIENT PSYCHIATRY ASSESSMENT NOTE - HPI (INCLUDE ILLNESS QUALITY, SEVERITY, DURATION, TIMING, CONTEXT, MODIFYING FACTORS, ASSOCIATED SIGNS AND SYMPTOMS)
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 She reports low mood, inability to enjoy herself, cannot even bring herself to watch tv, feeling increasingly hopeless about her financial situation, with low energy, 7lb weight loss in the last 3 weeks (is 4'11", 77lbs currently, BMI 15.6), increasing isolation, withdrawal from any social contacts, and worsening suicide ideation stating that if she cannot figure out her financial situation she will have to kill herself. She states that she has had some thoughts about how she would do this which come into her mind briefly but she did not clarify and states that she is too coward to try, but notes that she has been thinking more about them. She states that generally sleep has been okay though she did not sleep last night. She states that she had switched from her longer term psychiatrist/therapist Dr. Joshi to a DBT therapist/psychiatrist Dr. Foster. She states that she had been taking latuda, lamictal, and lithium, but felt overmedicated and told Dr. Foster and stopped the lithium several weeks ago. She states that she spoke to Dr. Joshi recently who recommended restarting lithium at a lower dose, but she did this once and felt too sedated so she stopped. She states that last night she forgot to take her latuda so she took it in the morning, and stated that today she felt catatonic, in that she went to [facility] with her friend and could barely engage, did not feel present, could barely think, and so friend brought her to the ED. She states that her therapist has been suggesting hospitalization for some time, as has her friend, but patient states that she has resisted because she is scared and it is embarassing, though she acknowledges that her symptoms are all worsening and outpatient treatment has not worked, so she is open to coming into the hospital at this point.    Regarding her weight, she states that she eats throughout the day but feels that she might be eating less per meal at this point, did not quantify, denies intentionally trying to lose weight, states that she feels it is due to her stress."    On the unit, pt is anxious, discharge focused, and frequently asking for reassurance. This is her first time being psychiatrically hospitalized. She describes a sense of doom about being on the inpatient setting making statements like "being here is going to make me crazy... this is going to go on my record... being here is worse than how I was feeling at home." Pt feels like she was pressured by her friend to admit herself into the hospital. She has significant ambivalence about needing higher of level of care, which had also been recommended to her by her psychiatrist and DBT therapist. She devalues the inpatient services ("the groups here are lame," "I don't have a lot of priti in the competence of the doctors here)" along with her current outpatient providers ("the DBT program is not helpful," "my psychiatrist doesn't know me very well."). In addition, she idealizes her prior psychiatrist who she is no longer able to afford and also private residential treatment programs which she also is not able to afford.    Pt continues to endorse suicidal ideation, passive in nature, due to financial stress ("If I don't have money I might as well kill myself"), but denies plan or intent. She is guarded about details of the financial stressors and only makes vague reference to issue with the IRS. She denies imminent consequences of financial stress, such as impending homelessness. Describes anhedonic and neurovegetative symptoms of depression, feeling "detached," "going through the motions," and "disappointed with life." Denies AH/VH. Denies HI. Denies FH of mental illness. She denies difficulty tending to ADLs or IADLs in the community. Reports taking care of her pet dog and managing her finances. Denies problems with memory. Reports losing job due to "my personality" and not declining performance. Reported feeling "lighter" since not having Latuda the past two days.     Collateral from pt's psychiatrist Dr. Foster: She strongly believes pt needs higher level of care for personality pathology. Private residential treatment would be most ideal; pt has interview with [facility] this week; but unclear if she can afford this level of care. She states pt developed active suicidal ideation last week when she went on vacation. She reports that the pt's prior psychiatrist was "convinced" that pt had bipolar depression with distinct episodes of jeanette (impulsive spending, elevated/irritable mood, pt reporting "feeling out of my mind"). She is supportive of stopping Latuda as effect is unclear and restarting Lithium.

## 2021-08-30 NOTE — BH PSYCHOLOGY - GROUP THERAPY NOTE - NSPSYCHOLGRPGENPT_PSY_A_CORE FT
Ms. Champion participated appropriately. Her mood was "okay" and her affect was constricted. No SI/AH/VH/PI noted.  TC was WNL.  Good attentional contorl although she reported being highly distracted at times.

## 2021-08-30 NOTE — BH INPATIENT PSYCHIATRY ASSESSMENT NOTE - NSBHCHARTREVIEWVS_PSY_A_CORE FT
Vital Signs Last 24 Hrs  T(C): 36.9 (08-30-21 @ 16:50), Max: 36.9 (08-30-21 @ 16:50)  T(F): 98.5 (08-30-21 @ 16:50), Max: 98.5 (08-30-21 @ 16:50)  HR: 67 (08-30-21 @ 16:50) (67 - 75)  BP: 133/78 (08-30-21 @ 16:50) (126/80 - 133/78)  BP(mean): --  RR: 18 (08-30-21 @ 16:50) (18 - 18)  SpO2: 98% (08-30-21 @ 16:50) (97% - 98%)

## 2021-08-30 NOTE — CHART NOTE - NSCHARTNOTEFT_GEN_A_CORE
PHYSICAL EXAM: Agree    VITALS: T(C): 36.9 (08-30-21 @ 16:50), Max: 36.9 (08-30-21 @ 16:50)  HR: 67 (08-30-21 @ 16:50) (67 - 75)  BP: 133/78 (08-30-21 @ 16:50) (126/80 - 133/78)  RR: 18 (08-30-21 @ 16:50) (18 - 18)  SpO2: 98% (08-30-21 @ 16:50) (97% - 98%)      GENERAL: NAD, comfortable, ambulating  HEAD:  Atraumatic, Normocephalic  EYES: EOMI, PERRLA, conjunctiva and sclera clear  ENT: Moist mucous membranes, no lymphadenopathy  NECK: Supple, No JVD  CHEST/LUNG: Clear to auscultation bilaterally; No rales, rhonchi, wheezing, or rubs. Unlabored respirations  HEART: Regular rate and rhythm; No murmurs, rubs, or gallops  ABDOMEN: BSx4; Soft, nontender, nondistended  EXTREMITIES:  No clubbing, cyanosis, or edema  MUSCULOSKELETAL: full active and passive range of motion, strength 5/5 UE and LE, normal gait  REFLEXES: 2+ biceps, brachioradialis, patellar, Achilles  NERVOUS SYSTEM:  A&Ox3, no focal deficits, CN II-XII grossly intact, rapid finger movement intact, sensation intact at V1V2V3 UE and LE  SKIN: No rashes or lesions

## 2021-08-30 NOTE — BH INPATIENT PSYCHIATRY ASSESSMENT NOTE - CASE SUMMARY
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 She reports low mood, inability to enjoy herself, cannot even bring herself to watch tv, feeling increasingly hopeless about her financial situation, with low energy, 7lb weight loss in the last 3 weeks (is 4'11", 77lbs currently, BMI 15.6), increasing isolation, withdrawal from any social contacts, and worsening suicide ideation stating that if she cannot figure out her financial situation she will have to kill herself. She states that she has had some thoughts about how she would do this which come into her mind briefly but she did not clarify and states that she is too coward to try, but notes that she has been thinking more about them. She states that generally sleep has been okay though she did not sleep last night. She states that she had switched from her longer term psychiatrist/therapist    ;;08/30: Fund of knowledge intact; Alert; oriented; cognition intact; speech clear; no tremor or evidence of movement impairment. Focused on discharge; wants to leave. Fair to good eye contact; speech clear spontaneous and normal volume . Thinking is congruent with affect; no pecularities of thinking or language use. anxious placed a 3DL requesting dc; ij poor: no recogition of issues of SI.

## 2021-08-30 NOTE — BH INPATIENT PSYCHIATRY ASSESSMENT NOTE - NSBHASSESSSUMMFT_PSY_ALL_CORE
This is a 61 year-old domiciled female who is currently unemployed with financial stressors and limited social supports who has past psychiatric history of suspected borderline personality disorder, bipolar versus unipolar depression, no previous psychiatric hospitalizations, no history of suicide attempts but h/o passive SI often in context of acute stressors, h/o impulsivity (not clearly described as within distinct hypomanic episode) no h/o distinct acute jeanette or psychoses, no known family h/o BPAD, suspected completed suicide in great grandfather, who presented to ER with friend in context of worsening depressive symptoms and passive SI. Outside treaters are concerned and both share history of patient declining with worsening depressive symptoms (low mood, recently decreased appetite, weight loss, anergia, difficulty concentrating, amotivation, anhedonia, hopelessness and increased thoughts of passive SI ("well if I cannot resolve my financial issues, then I can always just kill myself...I mean...this is not getting any better."). Initial impression is patient has superimposed depression on borderline personality disorder based on history from therapists but unclear if unipolar versus bipolar. Patient worsened while on lamotrigine, lurasidone, and Lithium (300mg) which may suggest undertreated unipolar major depressive episode although may also be that patient requires higher dosing of Lithium for mood stabilization. Patient perseverative about finances and has difficulty organizing her thoughts (indecisiveness i/c/o depression) and no overt delusional content elicited on evaluation but r/o psychotic features on further diagnostic evaluation. Previous SSRI/SNRI and mood stabilizer trials reportedly ineffective but may have been when BPD pathology more prominent and no MDE during those trials. Admit to inpatient psychiatric unit for stabilization, no 1:1 indicated while on unit. Gather collateral around previous medication trials and reassess current medication regimen and if an SSRI trial warranted/potentially beneficial (versus higher dosing of Lithium to target mood stability). Pt placed 72 hour letter on 8/30/21.    PLAN:  -vol admit  -C/w lamotrigine 100mg BID per pt preference  -DC lurasidone 60mg qhs per discussion with pt and pt's psychiatrist  -Restart Lithium 300mg HS  -For agitation/restlessness: PRN quetiapine 25mg q6h and lorazepam 1mg q6h (limit lorazepam dosing if requiring given AEs in elderly pt)   -PRN melatonin for insomnia  -PRN hydroxyzine 50mg qd for anxiety, limit dosing given anticholinergic effects in elderly pt   -Expand collateral and hx re. previous med regimens    Collateral Contacts:  -psychiatrist Dr. Foster #[phone], mobile#[phone]  -recent therapist Dr. Joshi [phone]  -current DBT therapist Dr. Yefri Glover  -friend (BIB this friend to ER and collateral in previous note) April Ty [phone].

## 2021-08-30 NOTE — BH INPATIENT PSYCHIATRY ASSESSMENT NOTE - OTHER PAST PSYCHIATRIC HISTORY (INCLUDE DETAILS REGARDING ONSET, COURSE OF ILLNESS, INPATIENT/OUTPATIENT TREATMENT)
No suicide attempts or hospitalizations  States that she has had a couple of impulsive periods in her life so while she denies jay jay manic or hypomanic episodes one of her doctors felt that she would better be treated as bipolar depression. States that Dr. Joshi started her on lithium and she had a consultation with Dr. Vivian Pierre at [facility] who started her on latuda

## 2021-08-30 NOTE — BH INPATIENT PSYCHIATRY ASSESSMENT NOTE - NSBHMETABOLIC_PSY_ALL_CORE_FT
BMI: BMI (kg/m2): 15.6 (08-29-21 @ 12:55)  HbA1c: A1C with Estimated Average Glucose Result: 5.4 % (08-30-21 @ 07:21)    Glucose:   BP: 133/78 (08-30-21 @ 16:50) (118/71 - 135/77)  Lipid Panel: Date/Time: 08-30-21 @ 07:21  Cholesterol, Serum: 231  Direct LDL: --  HDL Cholesterol, Serum: 107  Total Cholesterol/HDL Ration Measurement: --  Triglycerides, Serum: 62

## 2021-08-30 NOTE — BH INPATIENT PSYCHIATRY ASSESSMENT NOTE - CURRENT MEDICATION
MEDICATIONS  (STANDING):  lamoTRIgine 100 milliGRAM(s) Oral every 12 hours  lithium 300 milliGRAM(s) Oral at bedtime  multivitamin 1 Tablet(s) Oral daily    MEDICATIONS  (PRN):  acetaminophen   Tablet .. 650 milliGRAM(s) Oral every 6 hours PRN Mild Pain (1 - 3)  aluminum hydroxide/magnesium hydroxide/simethicone Suspension 30 milliLiter(s) Oral every 6 hours PRN Dyspepsia  LORazepam     Tablet 1 milliGRAM(s) Oral every 6 hours PRN anxiety, agitation  melatonin. 3 milliGRAM(s) Oral at bedtime PRN Insomnia  QUEtiapine 25 milliGRAM(s) Oral every 6 hours PRN acute agitation/restlessness

## 2021-08-30 NOTE — BH PSYCHOLOGY - GROUP THERAPY NOTE - NSBHPSYCHOLRESPCOMMENT_PSY_A_CORE FT
Pt able to identify ways in which she had mis-interpreted reactions to attempts she's made to connect with others.

## 2021-08-30 NOTE — BH INPATIENT PSYCHIATRY ASSESSMENT NOTE - RISK ASSESSMENT
Risk factors include worsening suicide ideation, ongoing stressor, hopelessness, anhedonia, recent change in medications, social isolation. Protective factors include lack of prior suicide attempts, lack of access to firearms, lack of psychosis, sobriety, residential stability, treatment seeking behavior, supportive friend

## 2021-08-31 LAB
A1C WITH ESTIMATED AVERAGE GLUCOSE RESULT: 5.5 % — SIGNIFICANT CHANGE UP (ref 4–5.6)
CHOLEST SERPL-MCNC: 211 MG/DL — HIGH
ESTIMATED AVERAGE GLUCOSE: 111 MG/DL — SIGNIFICANT CHANGE UP (ref 68–114)
HDLC SERPL-MCNC: 96 MG/DL — SIGNIFICANT CHANGE UP
LAMOTRIGINE SERPL-MCNC: 5.4 UG/ML — SIGNIFICANT CHANGE UP (ref 2–20)
LIPID PNL WITH DIRECT LDL SERPL: 104 MG/DL — HIGH
NON HDL CHOLESTEROL: 115 MG/DL — SIGNIFICANT CHANGE UP
TRIGL SERPL-MCNC: 56 MG/DL — SIGNIFICANT CHANGE UP

## 2021-08-31 PROCEDURE — 99233 SBSQ HOSP IP/OBS HIGH 50: CPT

## 2021-08-31 RX ADMIN — Medication 3 MILLIGRAM(S): at 21:28

## 2021-08-31 RX ADMIN — Medication 1 MILLIGRAM(S): at 21:28

## 2021-08-31 RX ADMIN — Medication 1 TABLET(S): at 10:27

## 2021-08-31 RX ADMIN — LAMOTRIGINE 100 MILLIGRAM(S): 25 TABLET, ORALLY DISINTEGRATING ORAL at 10:27

## 2021-08-31 RX ADMIN — LAMOTRIGINE 100 MILLIGRAM(S): 25 TABLET, ORALLY DISINTEGRATING ORAL at 21:26

## 2021-08-31 RX ADMIN — LITHIUM CARBONATE 300 MILLIGRAM(S): 300 TABLET, EXTENDED RELEASE ORAL at 21:26

## 2021-08-31 NOTE — BH INPATIENT PSYCHIATRY PROGRESS NOTE - NSBHCHARTREVIEWVS_PSY_A_CORE FT
Vital Signs Last 24 Hrs  T(C): 36.9 (08-30-21 @ 16:50), Max: 36.9 (08-30-21 @ 16:50)  T(F): 98.5 (08-30-21 @ 16:50), Max: 98.5 (08-30-21 @ 16:50)  HR: 67 (08-30-21 @ 16:50) (67 - 75)  BP: 133/78 (08-30-21 @ 16:50) (126/80 - 133/78)  BP(mean): --  RR: 18 (08-30-21 @ 16:50) (18 - 18)  SpO2: 98% (08-30-21 @ 16:50) (97% - 98%)     Vital Signs Last 24 Hrs  T(C): 36.9 (08-31-21 @ 08:39), Max: 36.9 (08-30-21 @ 16:50)  T(F): 98.4 (08-31-21 @ 08:39), Max: 98.5 (08-30-21 @ 16:50)  HR: 80 (08-31-21 @ 08:39) (67 - 80)  BP: 113/71 (08-31-21 @ 08:39) (113/71 - 133/78)  BP(mean): --  RR: 18 (08-31-21 @ 08:39) (18 - 18)  SpO2: 99% (08-31-21 @ 08:39) (98% - 99%)     Vital Signs Last 24 Hrs  T(C): 37.1 (08-31-21 @ 16:00), Max: 37.1 (08-31-21 @ 16:00)  T(F): 98.8 (08-31-21 @ 16:00), Max: 98.8 (08-31-21 @ 16:00)  HR: 77 (08-31-21 @ 16:00) (77 - 80)  BP: 108/68 (08-31-21 @ 16:00) (108/68 - 113/71)  BP(mean): --  RR: 18 (08-31-21 @ 16:00) (18 - 18)  SpO2: 98% (08-31-21 @ 16:00) (98% - 99%)

## 2021-08-31 NOTE — BH INPATIENT PSYCHIATRY PROGRESS NOTE - CASE SUMMARY
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 She reports low mood, inability to enjoy herself, cannot even bring herself to watch tv, feeling increasingly hopeless about her financial situation, with low energy, 7lb weight loss in the last 3 weeks (is 4'11", 77lbs currently, BMI 15.6), increasing isolation, withdrawal from any social contacts, and worsening suicide ideation stating that if she cannot figure out her financial situation she will have to kill herself. She states that she has had some thoughts about how she would do this which come into her mind briefly but she did not clarify and states that she is too coward to try, but notes that she has been thinking more about them. She states that generally sleep has been okay though she did not sleep last night. She states that she had switched from her longer term psychiatrist/therapist    ;;08/30: Fund of knowledge intact; Alert; oriented; cognition intact; speech clear; no tremor or evidence of movement impairment. Focused on discharge; wants to leave. Fair to good eye contact; speech clear spontaneous and normal volume . Thinking is congruent with affect; no peculiarities of thinking or language use. anxious placed a 3DL requesting dc; ij poor: no recognition of issues of SI.    ;;08/31: Alert; oriented; cognition intact; speech clear; no tremor or evidence of movement impairment.  Not endorsing  suicidal or homicidal ideation intent or plans; no mention of auditory or visual hallucinations  ; unclear about mood but affect is rather depressed; Sleep  appetite ok; no pain issues. Focused on discharge; wants to leave.  Dog (pets) a protective factor; being assessed by psychologist.  Does not wish to complete her treatment as an inpatient.  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 She reports low mood, inability to enjoy herself, cannot even bring herself to watch tv, feeling increasingly hopeless about her financial situation, with low energy, 7lb weight loss in the last 3 weeks (is 4'11", 77lbs currently, BMI 15.6), increasing isolation, withdrawal from any social contacts, and worsening suicide ideation stating that if she cannot figure out her financial situation she will have to kill herself. She states that she has had some thoughts about how she would do this which come into her mind briefly but she did not clarify and states that she is too coward to try, but notes that she has been thinking more about them. She states that generally sleep has been okay though she did not sleep last night. She states that she had switched from her longer term psychiatrist/therapist    ;;08/30: Fund of knowledge intact; Alert; oriented; cognition intact; speech clear; no tremor or evidence of movement impairment. Focused on discharge; wants to leave. Fair to good eye contact; speech clear spontaneous and normal volume . Thinking is congruent with affect; no peculiarities of thinking or language use. anxious placed a 3DL requesting dc; ij poor: no recognition of issues of SI.    ;;08/31: Alert; oriented; cognition intact; speech clear; no tremor or evidence of movement impairment.  Not endorsing  suicidal or homicidal ideation intent or plans; no mention of auditory or visual hallucinations  ; unclear about mood but affect is rather depressed; Sleep  appetite ok; no pain issues. Focused on discharge; wants to leave.  Dog (pets) a protective factor; being assessed by psychologist.  Does not wish to complete her treatment as an inpatient.   However assessment by treating resident indicates at MOCA of 17; will need to investigate prior to dc.
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
## 2021-08-31 NOTE — BH INPATIENT PSYCHIATRY PROGRESS NOTE - NSBHMETABOLIC_PSY_ALL_CORE_FT
BMI: BMI (kg/m2): 15.6 (08-29-21 @ 12:55)  HbA1c: A1C with Estimated Average Glucose Result: 5.5 % (08-31-21 @ 07:16)    Glucose:   BP: 133/78 (08-30-21 @ 16:50) (118/71 - 135/77)  Lipid Panel: Date/Time: 08-31-21 @ 07:16  Cholesterol, Serum: 211  Direct LDL: --  HDL Cholesterol, Serum: 96  Total Cholesterol/HDL Ration Measurement: --  Triglycerides, Serum: 56   BMI: BMI (kg/m2): 15.6 (08-29-21 @ 12:55)  HbA1c: A1C with Estimated Average Glucose Result: 5.5 % (08-31-21 @ 07:16)    Glucose:   BP: 113/71 (08-31-21 @ 08:39) (113/71 - 135/77)  Lipid Panel: Date/Time: 08-31-21 @ 07:16  Cholesterol, Serum: 211  Direct LDL: --  HDL Cholesterol, Serum: 96  Total Cholesterol/HDL Ration Measurement: --  Triglycerides, Serum: 56   BMI: BMI (kg/m2): 15.6 (08-29-21 @ 12:55)  HbA1c: A1C with Estimated Average Glucose Result: 5.5 % (08-31-21 @ 07:16)    Glucose:   BP: 108/68 (08-31-21 @ 16:00) (108/68 - 135/77)  Lipid Panel: Date/Time: 08-31-21 @ 07:16  Cholesterol, Serum: 211  Direct LDL: --  HDL Cholesterol, Serum: 96  Total Cholesterol/HDL Ration Measurement: --  Triglycerides, Serum: 56

## 2021-08-31 NOTE — BH INPATIENT PSYCHIATRY PROGRESS NOTE - MODIFICATIONS
continue current meds; will need psychological assessment.  monitor Lithium levels; may need time to reach steady state and clinical response.  continue current meds; will need psychological assessment.  monitor Lithium levels; may need time to reach steady state and clinical response.  MOCA of 17 to be investigated; head CT labs appropriate.

## 2021-08-31 NOTE — BH INPATIENT PSYCHIATRY PROGRESS NOTE - NSBHASSESSSUMMFT_PSY_ALL_CORE
This is a 61 year-old domiciled female who is currently unemployed with financial stressors and limited social supports who has past psychiatric history of suspected borderline personality disorder, bipolar versus unipolar depression, no previous psychiatric hospitalizations, no history of suicide attempts but h/o passive SI often in context of acute stressors, h/o impulsivity (not clearly described as within distinct hypomanic episode) no h/o distinct acute jeanette or psychoses, no known family h/o BPAD, suspected completed suicide in great grandfather, who presented to ER with friend in context of worsening depressive symptoms and passive SI. Outside treaters are concerned and both share history of patient declining with worsening depressive symptoms (low mood, recently decreased appetite, weight loss, anergia, difficulty concentrating, amotivation, anhedonia, hopelessness and increased thoughts of passive SI ("well if I cannot resolve my financial issues, then I can always just kill myself...I mean...this is not getting any better."). Initial impression is patient has superimposed depression on borderline personality disorder based on history from therapists but unclear if unipolar versus bipolar. Patient worsened while on lamotrigine, lurasidone, and Lithium (300mg) which may suggest undertreated unipolar major depressive episode although may also be that patient requires higher dosing of Lithium for mood stabilization. Patient perseverative about finances and has difficulty organizing her thoughts (indecisiveness i/c/o depression) and no overt delusional content elicited on evaluation but r/o psychotic features on further diagnostic evaluation. Previous SSRI/SNRI and mood stabilizer trials reportedly ineffective but may have been when BPD pathology more prominent and no MDE during those trials. Admit to inpatient psychiatric unit for stabilization, no 1:1 indicated while on unit. Gather collateral around previous medication trials and reassess current medication regimen and if an SSRI trial warranted/potentially beneficial (versus higher dosing of Lithium to target mood stability). Pt placed 72 hour letter on 8/30/21.    PLAN:  -vol admit  -C/w lamotrigine 100mg BID per pt preference  -C/w Lithium 300mg HS  -Lurasidone 60mg qhs DC'd 8/30 per discussion with pt and pt's psychiatrist  -For agitation/restlessness: PRN quetiapine 25mg q6h and lorazepam 1mg q6h (limit lorazepam dosing if requiring given AEs in elderly pt)   -PRN melatonin for insomnia  -PRN hydroxyzine 50mg qd for anxiety, limit dosing given anticholinergic effects in elderly pt   -Expand collateral and hx re. previous med regimens  -F/u Dr. Joshi as pt may be interested in returning to her upon discharge instead of DBT program    Collateral Contacts:  -psychiatrist Dr. Foster #[phone], mobile#[phone]  -recent therapist Dr. Joshi [phone]  -current DBT therapist Dr. Yefri Glover  -friend (BIB this friend to ER and collateral in previous note) April [place] [phone].

## 2021-08-31 NOTE — BH INPATIENT PSYCHIATRY PROGRESS NOTE - NSICDXBHSECONDARYDX_PSY_ALL_CORE
Borderline personality disorder   F60.3   Severe episode of recurrent major depressive disorder, without psychotic features   F33.2  Borderline personality disorder   F60.3
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
## 2021-08-31 NOTE — BH PSYCHOLOGY - CLINICIAN PSYCHOTHERAPY NOTE - NSBHPSYCHOLADDL_PSY_A_CORE
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 Of note, after this meeting, another clinician (Dr. Duran) administered a MoCA and pt obtained a score of 20, which sheds a different light on some of the information gathered during this meeting. Suicide risk is low to moderate, as pt reports passive SI for 2y, without clear plan and no intente. Static risk factors include mental health hx, complicated relationship with  father; modifiable risk factors include: mood episode, financial stressors, difficulties with ADLs, emotion regulation. Protective factors include identifying reasons for living (dog), friends, future-oriented and good relationship with outside provider, no previous attempts. Pt's severe depressive mood episode seems triggered by a strong disappointment in herself in the context of external factors (disappointment with house) and potentially by a loss of capacity, which may be further assessed. In addition, although pt reports no manic episode and her past impulsive acts seem related to idealization, her outside provider of 2y suspects bipolar disorder, so the differential remains to be made. DXs: R/O Bipolar, R/O Borderline Personality, R/O organic causes for depression.

## 2021-08-31 NOTE — BH INPATIENT PSYCHIATRY PROGRESS NOTE - PRN MEDS
MEDICATIONS  (PRN):  acetaminophen   Tablet .. 650 milliGRAM(s) Oral every 6 hours PRN Mild Pain (1 - 3)  aluminum hydroxide/magnesium hydroxide/simethicone Suspension 30 milliLiter(s) Oral every 6 hours PRN Dyspepsia  LORazepam     Tablet 1 milliGRAM(s) Oral every 6 hours PRN anxiety, agitation  melatonin. 3 milliGRAM(s) Oral at bedtime PRN Insomnia  QUEtiapine 25 milliGRAM(s) Oral every 6 hours PRN acute agitation/restlessness

## 2021-08-31 NOTE — BH PSYCHOLOGY - CLINICIAN PSYCHOTHERAPY NOTE - NSBHPSYCHOLRESPONSE_PSY_A_CORE FT
Pt was engaged but persevered in her thinking (i.e., did not see any way her stay could help, even with clinician's suggestions)

## 2021-09-01 LAB
FOLATE SERPL-MCNC: 19.4 NG/ML — SIGNIFICANT CHANGE UP
SARS-COV-2 RNA SPEC QL NAA+PROBE: SIGNIFICANT CHANGE UP
T PALLIDUM AB TITR SER: NEGATIVE — SIGNIFICANT CHANGE UP
TSH SERPL-MCNC: 1.47 UIU/ML — SIGNIFICANT CHANGE UP (ref 0.27–4.2)
VIT B12 SERPL-MCNC: 1645 PG/ML — HIGH (ref 232–1245)

## 2021-09-01 PROCEDURE — 90853 GROUP PSYCHOTHERAPY: CPT

## 2021-09-01 PROCEDURE — 99232 SBSQ HOSP IP/OBS MODERATE 35: CPT

## 2021-09-01 RX ADMIN — Medication 3 MILLIGRAM(S): at 21:26

## 2021-09-01 RX ADMIN — LAMOTRIGINE 100 MILLIGRAM(S): 25 TABLET, ORALLY DISINTEGRATING ORAL at 21:26

## 2021-09-01 RX ADMIN — Medication 1 TABLET(S): at 14:21

## 2021-09-01 RX ADMIN — LAMOTRIGINE 100 MILLIGRAM(S): 25 TABLET, ORALLY DISINTEGRATING ORAL at 09:12

## 2021-09-01 RX ADMIN — LITHIUM CARBONATE 300 MILLIGRAM(S): 300 TABLET, EXTENDED RELEASE ORAL at 21:26

## 2021-09-01 RX ADMIN — Medication 1 MILLIGRAM(S): at 21:26

## 2021-09-01 NOTE — BH INPATIENT PSYCHIATRY PROGRESS NOTE - NSBHMETABOLIC_PSY_ALL_CORE_FT
BMI: BMI (kg/m2): 15.6 (08-29-21 @ 12:55)  HbA1c: A1C with Estimated Average Glucose Result: 5.5 % (08-31-21 @ 07:16)    Glucose:   BP: 108/68 (08-31-21 @ 16:00) (108/68 - 135/77)  Lipid Panel: Date/Time: 08-31-21 @ 07:16  Cholesterol, Serum: 211  Direct LDL: --  HDL Cholesterol, Serum: 96  Total Cholesterol/HDL Ration Measurement: --  Triglycerides, Serum: 56   BMI: BMI (kg/m2): 15.6 (08-29-21 @ 12:55)  HbA1c: A1C with Estimated Average Glucose Result: 5.5 % (08-31-21 @ 07:16)    Glucose:   BP: 109/61 (09-01-21 @ 09:00) (108/68 - 133/78)  Lipid Panel: Date/Time: 08-31-21 @ 07:16  Cholesterol, Serum: 211  Direct LDL: --  HDL Cholesterol, Serum: 96  Total Cholesterol/HDL Ration Measurement: --  Triglycerides, Serum: 56

## 2021-09-01 NOTE — BH INPATIENT PSYCHIATRY PROGRESS NOTE - NSBHASSESSSUMMFT_PSY_ALL_CORE
This is a 61 year-old domiciled female who is currently unemployed with financial stressors and limited social supports who has past psychiatric history of suspected borderline personality disorder, bipolar versus unipolar depression, no previous psychiatric hospitalizations, no history of suicide attempts but h/o passive SI often in context of acute stressors, h/o impulsivity (not clearly described as within distinct hypomanic episode) no h/o distinct acute jeanette or psychoses, no known family h/o BPAD, suspected completed suicide in great grandfather, who presented to ER with friend in context of worsening depressive symptoms and passive SI. Outside treaters are concerned and both share history of patient declining with worsening depressive symptoms (low mood, recently decreased appetite, weight loss, anergia, difficulty concentrating, amotivation, anhedonia, hopelessness and increased thoughts of passive SI ("well if I cannot resolve my financial issues, then I can always just kill myself...I mean...this is not getting any better."). Initial impression is patient has superimposed depression on borderline personality disorder based on history from therapists but unclear if unipolar versus bipolar. Patient worsened while on lamotrigine, lurasidone, and Lithium (300mg) which may suggest undertreated unipolar major depressive episode although may also be that patient requires higher dosing of Lithium for mood stabilization. Patient perseverative about finances and has difficulty organizing her thoughts (indecisiveness i/c/o depression) and no overt delusional content elicited on evaluation but r/o psychotic features on further diagnostic evaluation. Previous SSRI/SNRI and mood stabilizer trials reportedly ineffective but may have been when BPD pathology more prominent and no MDE during those trials. Admit to inpatient psychiatric unit for stabilization, no 1:1 indicated while on unit. Gather collateral around previous medication trials and reassess current medication regimen and if an SSRI trial warranted/potentially beneficial (versus higher dosing of Lithium to target mood stability). Pt placed 72 hour letter on 8/30/21.    ;;09/01: The patient has requested release.  At this point the patient is in need of further treatment for current symptoms and concerns:  To review need for treatment with patient and assess response.  anxious and sad; tends to discount SI and focus on milieu.   This is a 61 year-old domiciled female who is currently unemployed with financial stressors and limited social supports who has past psychiatric history of suspected borderline personality disorder, bipolar versus unipolar depression, no previous psychiatric hospitalizations, no history of suicide attempts but h/o passive SI often in context of acute stressors, h/o impulsivity (not clearly described as within distinct hypomanic episode) no h/o distinct acute jeanette or psychoses, no known family h/o BPAD, suspected completed suicide in great grandfather, who presented to ER with friend in context of worsening depressive symptoms and passive SI. Outside treaters are concerned and both share history of patient declining with worsening depressive symptoms (low mood, recently decreased appetite, weight loss, anergia, difficulty concentrating, amotivation, anhedonia, hopelessness and increased thoughts of passive SI ("well if I cannot resolve my financial issues, then I can always just kill myself...I mean...this is not getting any better."). Initial impression is patient has superimposed depression on borderline personality disorder based on history from therapists but unclear if unipolar versus bipolar. Patient worsened while on lamotrigine, lurasidone, and Lithium (300mg) which may suggest undertreated unipolar major depressive episode although may also be that patient requires higher dosing of Lithium for mood stabilization. Patient perseverative about finances and has difficulty organizing her thoughts (indecisiveness i/c/o depression) and no overt delusional content elicited on evaluation but r/o psychotic features on further diagnostic evaluation. Previous SSRI/SNRI and mood stabilizer trials reportedly ineffective but may have been when BPD pathology more prominent and no MDE during those trials. Admit to inpatient psychiatric unit for stabilization, no 1:1 indicated while on unit. Gather collateral around previous medication trials and reassess current medication regimen and if an SSRI trial warranted/potentially beneficial (versus higher dosing of Lithium to target mood stability). Pt placed 72 hour letter on 8/30/21.    ;;09/01: The patient has requested release.  At this point the patient is in need of further treatment for current symptoms and concerns:  To review need for treatment with patient and assess response.  anxious and sad; tends to discount SI and focus on milieu.  b12 folate tsh and ftp negative.  MRI to be scheduled.

## 2021-09-01 NOTE — BH PSYCHOLOGY - GROUP THERAPY NOTE - NSBHPSYCHOLPARTICIPCOMMENT_PSY_A_CORE FT
Akira spoke about wanting to connect more with others, but being proccupied with her own problems. She labeled this "Self-centered" but on futher exploration, the group noted that this is also adaptive, in the sense that it can protect her from feeling vulnerable and scared. 
Devaluing of interventions

## 2021-09-01 NOTE — BH INPATIENT PSYCHIATRY PROGRESS NOTE - NSBHFUPINTERVALHXFT_PSY_A_CORE
discussed low MOCA scores with patient and need for evaluation; repeating MOCA with psychology ; to order CT of the head;  patient interested in [place]; worries about cost;  Alert; oriented;  speech clear; no tremor or evidence of movement impairment.  Not endorsing  suicidal or homicidal ideation intent or plans; no mention of auditory or visual hallucinations  Thinking is congruent with affect; no peculiarities of thinking or language use.  Fair to good eye contact; speech clear spontaneous and normal volume  good adls; speech slow; some blocking.  very anxious

## 2021-09-01 NOTE — BH INPATIENT PSYCHIATRY PROGRESS NOTE - NSBHCHARTREVIEWVS_PSY_A_CORE FT
Vital Signs Last 24 Hrs  T(C): 37.1 (08-31-21 @ 16:00), Max: 37.1 (08-31-21 @ 16:00)  T(F): 98.8 (08-31-21 @ 16:00), Max: 98.8 (08-31-21 @ 16:00)  HR: 77 (08-31-21 @ 16:00) (77 - 80)  BP: 108/68 (08-31-21 @ 16:00) (108/68 - 113/71)  BP(mean): --  RR: 18 (08-31-21 @ 16:00) (18 - 18)  SpO2: 98% (08-31-21 @ 16:00) (98% - 99%)     Vital Signs Last 24 Hrs  T(C): 37.1 (08-31-21 @ 16:00), Max: 37.1 (08-31-21 @ 16:00)  T(F): 98.8 (08-31-21 @ 16:00), Max: 98.8 (08-31-21 @ 16:00)  HR: 95 (09-01-21 @ 09:00) (77 - 95)  BP: 109/61 (09-01-21 @ 09:00) (108/68 - 109/61)  BP(mean): --  RR: 18 (09-01-21 @ 09:00) (18 - 18)  SpO2: 94% (09-01-21 @ 09:00) (94% - 98%)

## 2021-09-01 NOTE — BH INPATIENT PSYCHIATRY PROGRESS NOTE - NSICDXBHSECONDARYDX_PSY_ALL_CORE
Severe episode of recurrent major depressive disorder, without psychotic features   F33.2  Borderline personality disorder   F60.3

## 2021-09-01 NOTE — BH PSYCHOLOGY - GROUP THERAPY NOTE - NSBHPSYCHOLADDL_PSY_A_CORE
Progressive Muscle Relaxation completed, pt engaged and reported that she feels that same after the group

## 2021-09-01 NOTE — BH INPATIENT PSYCHIATRY PROGRESS NOTE - OTHER
Physical Therapy Daily Treatment Note     Name: Indio Ryder Jr.  Clinic Number: [id number]    Therapy Diagnosis:   No diagnosis found.  Physician: Lorena Thakur MD    Visit Date: 8/20/2020    Therapy Diagnosis: decreased cervical and L shoulder ROM and pain with LUE weakness  Physician: Lorena Thakur MD     Physician Orders: PT Eval and Treat   Medical Diagnosis from Referral:       M54.12 (ICD-10-CM) - Cervical radiculopathy      Evaluation Date: 7/30/2020  Authorization Period Expiration: 10/31/2020  Plan of Care Expiration: 9/31/20  Visit # / Visits authorized: 4/ 16 (+1 for initial eval)      Time In: 10:17am   Time Out: 11:05am   Total Billable Time: 48 minutes   Charges: 3 TE     Precautions: covid -19 infection status    Subjective     Pt reports: that he has stiffness in L shoulder today but no pain.  He was compliant with home exercise program.   Response to previous treatment: no adverse effects  Functional change: none thus far    Pain: 0/10  Pain level after session:  0/10  Location: n/a    Objective      Indio received therapeutic exercises to develop strength, endurance and ROM for 30 minutes including:   Progressions/additions in bold.    Chin tuck- x15 with 5 sec holds  Cervical rotation x20 with 5 sec holds  Upper trap stretch bilaterally 2x30 sec  Levator scap stretch 2x30 sec    Shoulder extension OTB 3 x 10  Shoulder row OTB 3 x 10  ER OTB 3 x 10  Shoulder Flexion upright against wall 3 x 10  Shld Abduction supine 3 x 10 (will progress to upright against wall)  Scapular stabilization supine at 90 deg flexion and then 120 flex 3 x 10 CW and CCW circles (will progress towards upright)         Indio received the following manual therapy techniques: Manual traction, Myofacial release and Soft tissue Mobilization were applied to the: L shoulder into abduction (inferior capsular stretch), external rotation (both limited). Overhead flexion, ER in scaption with elbow flexed (able to  nearly reach oppositive shoulder)     Home Exercises Provided and Patient Education Provided     Education provided:   - Continue previous HEP     Written Home Exercises Provided: Patient instructed to cont prior HEP.  Exercises were reviewed and Indio was able to demonstrate them prior to the end of the session.  Indio demonstrated good  understanding of the education provided.     See EMR under Patient Instructions for exercises provided 8/11/2020.      Assessment   Pt presented without any L shoulder pain upon arrival but did endorse L shoulder stiffness upon arrival. Attempted to progress pt to L shoulder abd against wall and to performing upright cw/ccw circles, but pt was unable to do so secondary to increased L shoulder pain when attempting those exercises. Pt will benefit from continued PT services, focusing on regaining strength, AROM in the neck and L shoulder to allow pain-free motion.     Indio is progressing well towards his goals.   Pt prognosis is Fair. [place].     Pt will continue to benefit from skilled outpatient physical therapy to address the deficits listed in the problem list box on initial evaluation, provide pt/family education and to maximize pt's level of independence in the home and community environment.   Pt's spiritual, cultural and educational needs considered and pt agreeable to plan of care and goals.    Anticipated barriers to physical therapy: to be determined following MRI    Goals:  Short Term Goals: 3 weeks   1. Pt to be Independent with HEP for increased strength, endurance and functional mobility. Met (8/20/2020)  2. Pt to have decreased pain 3/10 for increased functional mobility and tolerance. Progressing, not met  3. Pt to increase AROM to 60 cervical rotation bilaterally in degrees for increased functional mobility. Progressing, not met        Long Term Goals: 8 weeks   1. Pt to be Independent with pain management strategies and verbalize 2 for increased strength,  endurance and functional mobility. Progressing, not met  2. Pt to have decreased pain 2/10 for increased functional mobility and tolerance. Progressing, not met  3. Pt to increase AROM to 150 in abduction and 170 in flexion degrees on L shoulder for increased functional mobility. Progressing, not met  4. 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 Progressing, not met  5. Pt increase L shoulder strength in abd and flexion to 4/5 for increased functional activity. Progressing, not met    Plan   Plan of care Certification: 7/30/2020 to 9/31/20.     Outpatient Physical Therapy 2 times weekly for 8 weeks to include the following interventions: Cervical/Lumbar Traction, Manual Therapy, Moist Heat/ Ice, Neuromuscular Re-ed, Patient Education and Therapeutic Exercise.       Javid Griffiths, PTA      Tearful MOCA 20/30 on 8/31/21. Anxious

## 2021-09-01 NOTE — BH INPATIENT PSYCHIATRY PROGRESS NOTE - NSTXDEPRESGOAL_PSY_ALL_CORE
Other... Report using a coping skill to overcome sadness and worry in order to socialize with peers daily

## 2021-09-02 PROCEDURE — 99233 SBSQ HOSP IP/OBS HIGH 50: CPT

## 2021-09-02 RX ORDER — LITHIUM CARBONATE 300 MG/1
300 TABLET, EXTENDED RELEASE ORAL EVERY 12 HOURS
Refills: 0 | Status: DISCONTINUED | OUTPATIENT
Start: 2021-09-02 | End: 2021-09-09

## 2021-09-02 RX ADMIN — Medication 1 MILLIGRAM(S): at 21:50

## 2021-09-02 RX ADMIN — LAMOTRIGINE 100 MILLIGRAM(S): 25 TABLET, ORALLY DISINTEGRATING ORAL at 21:50

## 2021-09-02 RX ADMIN — LAMOTRIGINE 100 MILLIGRAM(S): 25 TABLET, ORALLY DISINTEGRATING ORAL at 09:25

## 2021-09-02 RX ADMIN — Medication 1 TABLET(S): at 09:25

## 2021-09-02 RX ADMIN — LITHIUM CARBONATE 300 MILLIGRAM(S): 300 TABLET, EXTENDED RELEASE ORAL at 21:49

## 2021-09-02 RX ADMIN — Medication 3 MILLIGRAM(S): at 21:50

## 2021-09-02 NOTE — BH PSYCHOLOGY - CLINICIAN PSYCHOTHERAPY NOTE - NSBHPSYCHOLADDL_PSY_A_CORE
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 Pt seen 1:1 for a second MoCA administration (1st admin by Dr. Duran on 21, score of 20) and obtained a score of 25, which is slightly below the cutoff (26) but well above her previous score. Pt remains anxious about her care and discharge-oriented. Suicide risk is low to moderate, as pt reports passive SI for 2y, without clear plan and no intent. Static risk factors include mental health hx, complicated relationship with  father; modifiable risk factors include: mood episode, financial stressors, difficulties with ADLs, emotion regulation. Protective factors include identifying reasons for living (dog), friends, future-oriented and good relationship with outside provider, no previous attempts. Pt's severe depressive mood episode seems triggered by a strong disappointment in herself and potentially by a loss of capacity, an MRI has been ordered. In addition, although pt reports no manic episode and her past impulsive acts seem related to idealization, her outside provider of 2y suspects bipolar disorder, so the differential remains to be made. DXs: R/O Bipolar, R/O Borderline Personality, R/O organic causes for depression. 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 Pt seen 1:1 for a second MoCA administration (1st admin by Dr. Duran on 21, score of 20) and obtained a score of 25, which is slightly below the cutoff (26) but well above her previous score. The reason for this discrepancy is unclear. Pt remains anxious about her care and discharge-oriented. Suicide risk is low to moderate, as pt reports passive SI for 2y, without clear plan and no intent. Static risk factors include mental health hx, complicated relationship with  father; modifiable risk factors include: mood episode, financial stressors, difficulties with ADLs, emotion regulation. Protective factors include identifying reasons for living (dog), friends, future-oriented and good relationship with outside provider, no previous attempts. Pt's severe depressive mood episode seems triggered by a strong disappointment in herself and potentially by a loss of capacity, an MRI has been ordered. In addition, although pt reports no manic episode and her past impulsive acts seem related to idealization, her outside provider of 2y suspects bipolar disorder, so the differential remains to be made. DXs: R/O Bipolar, R/O Borderline Personality, R/O organic causes for depression.

## 2021-09-02 NOTE — BH PSYCHOLOGY - CLINICIAN PSYCHOTHERAPY NOTE - NSTXDEPRESGOALOTHER_PSY_ALL_CORE
Pt will participate in groups and milieu tx structure of unit
Pt will participate in groups and milieu tx structure of unit

## 2021-09-02 NOTE — BH INPATIENT PSYCHIATRY PROGRESS NOTE - CURRENT MEDICATION
MEDICATIONS  (STANDING):  lamoTRIgine 100 milliGRAM(s) Oral every 12 hours  lithium 300 milliGRAM(s) Oral every 12 hours  multivitamin 1 Tablet(s) Oral daily    MEDICATIONS  (PRN):  acetaminophen   Tablet .. 650 milliGRAM(s) Oral every 6 hours PRN Mild Pain (1 - 3)  aluminum hydroxide/magnesium hydroxide/simethicone Suspension 30 milliLiter(s) Oral every 6 hours PRN Dyspepsia  LORazepam     Tablet 1 milliGRAM(s) Oral every 6 hours PRN anxiety, agitation  melatonin. 3 milliGRAM(s) Oral at bedtime PRN Insomnia  QUEtiapine 25 milliGRAM(s) Oral every 6 hours PRN acute agitation/restlessness

## 2021-09-02 NOTE — BH INPATIENT PSYCHIATRY PROGRESS NOTE - NSBHASSESSSUMMFT_PSY_ALL_CORE
This is a 61 year-old domiciled female who is currently unemployed with financial stressors and limited social supports who has past psychiatric history of suspected borderline personality disorder, bipolar versus unipolar depression, no previous psychiatric hospitalizations, no history of suicide attempts but h/o passive SI often in context of acute stressors, h/o impulsivity (not clearly described as within distinct hypomanic episode) no h/o distinct acute jeanette or psychoses, no known family h/o BPAD, suspected completed suicide in great grandfather, who presented to ER with friend in context of worsening depressive symptoms and passive SI. Outside treaters are concerned and both share history of patient declining with worsening depressive symptoms (low mood, recently decreased appetite, weight loss, anergia, difficulty concentrating, amotivation, anhedonia, hopelessness and increased thoughts of passive SI ("well if I cannot resolve my financial issues, then I can always just kill myself...I mean...this is not getting any better."). Initial impression is patient has superimposed depression on borderline personality disorder based on history from therapists but unclear if unipolar versus bipolar. Patient worsened while on lamotrigine, lurasidone, and Lithium (300mg) which may suggest undertreated unipolar major depressive episode although may also be that patient requires higher dosing of Lithium for mood stabilization. Patient perseverative about finances and has difficulty organizing her thoughts (indecisiveness i/c/o depression) and no overt delusional content elicited on evaluation but r/o psychotic features on further diagnostic evaluation. Previous SSRI/SNRI and mood stabilizer trials reportedly ineffective but may have been when BPD pathology more prominent and no MDE during those trials. Admit to inpatient psychiatric unit for stabilization, no 1:1 indicated while on unit. Gather collateral around previous medication trials and reassess current medication regimen and if an SSRI trial warranted/potentially beneficial (versus higher dosing of Lithium to target mood stability). Pt placed 72 hour letter on 8/30/21.    ;;09/01: The patient has requested release.  At this point the patient is in need of further treatment for current symptoms and concerns:  To review need for treatment with patient and assess response.  anxious and sad; tends to discount SI and focus on milieu.  b12 folate tsh and ftp negative.  MRI to be scheduled.

## 2021-09-02 NOTE — BH INPATIENT PSYCHIATRY PROGRESS NOTE - NSBHCHARTREVIEWVS_PSY_A_CORE FT
Vital Signs Last 24 Hrs  T(C): 37 (09-02-21 @ 09:43), Max: 37.1 (09-01-21 @ 16:23)  T(F): 98.6 (09-02-21 @ 09:43), Max: 98.8 (09-01-21 @ 16:23)  HR: 86 (09-02-21 @ 09:43) (73 - 86)  BP: 102/61 (09-02-21 @ 09:43) (102/61 - 123/69)  BP(mean): --  RR: 18 (09-02-21 @ 09:43) (18 - 18)  SpO2: 97% (09-02-21 @ 09:43) (97% - 97%)     Vital Signs Last 24 Hrs  T(C): 37.2 (09-03-21 @ 16:24), Max: 37.2 (09-03-21 @ 16:24)  T(F): 98.9 (09-03-21 @ 16:24), Max: 98.9 (09-03-21 @ 16:24)  HR: 87 (09-03-21 @ 16:24) (87 - 100)  BP: 120/68 (09-03-21 @ 16:24) (120/68 - 122/70)  BP(mean): --  RR: 18 (09-03-21 @ 16:24) (18 - 18)  SpO2: 98% (09-03-21 @ 16:24) (96% - 98%)

## 2021-09-02 NOTE — BH INPATIENT PSYCHIATRY PROGRESS NOTE - NSBHFUPINTERVALCCFT_PSY_A_CORE
Pt seen for bipolar depression "If my financial affairs are messed up by my staying here longer, then I will kill myself."

## 2021-09-02 NOTE — BH INPATIENT PSYCHIATRY PROGRESS NOTE - NSBHMETABOLIC_PSY_ALL_CORE_FT
BMI: BMI (kg/m2): 15.6 (08-29-21 @ 12:55)  HbA1c: A1C with Estimated Average Glucose Result: 5.5 % (08-31-21 @ 07:16)    Glucose:   BP: 102/61 (09-02-21 @ 09:43) (102/61 - 133/78)  Lipid Panel: Date/Time: 08-31-21 @ 07:16  Cholesterol, Serum: 211  Direct LDL: --  HDL Cholesterol, Serum: 96  Total Cholesterol/HDL Ration Measurement: --  Triglycerides, Serum: 56   BMI: BMI (kg/m2): 15.6 (08-29-21 @ 12:55)  HbA1c: A1C with Estimated Average Glucose Result: 5.5 % (08-31-21 @ 07:16)    Glucose:   BP: 120/68 (09-03-21 @ 16:24) (102/61 - 123/69)  Lipid Panel: Date/Time: 08-31-21 @ 07:16  Cholesterol, Serum: 211  Direct LDL: --  HDL Cholesterol, Serum: 96  Total Cholesterol/HDL Ration Measurement: --  Triglycerides, Serum: 56

## 2021-09-02 NOTE — BH INPATIENT PSYCHIATRY PROGRESS NOTE - OTHER
Tearful MOCA 20/30 on 8/31/21. Anxious MOCA 20/30 on 8/31/21. MOCA 25 today as per psych extern Anxious, cooperative yet hostile

## 2021-09-02 NOTE — BH INPATIENT PSYCHIATRY PROGRESS NOTE - NSBHFUPINTERVALHXFT_PSY_A_CORE
discussed low MOCA scores with patient and need for evaluation; repeating MOCA with psychology ; to order CT of the head;  patient interested in [place]; worries about cost;  Alert; oriented;  speech clear; no tremor or evidence of movement impairment.  Not endorsing  suicidal or homicidal ideation intent or plans; no mention of auditory or visual hallucinations  Thinking is congruent with affect; no peculiarities of thinking or language use.  Fair to good eye contact; speech clear spontaneous and normal volume  good adls; speech slow; some blocking.  very anxious  Was on verge of making pt 2PC but she retracted 72 hour letter with 2 minutes to spare. She remains depressed with poor ADLs and some SI as chief complaint above shows. Followedby Dr. Ashwini Swenson who suggested she come to the hospital. Discussed MOCA findings with psych extern who feels she likely does not have dementia. Pt agreed to increase in lithium to 300 mg q12h.

## 2021-09-02 NOTE — BH PSYCHOLOGY - CLINICIAN PSYCHOTHERAPY NOTE - NSBHPSYCHOLNARRATIVE_PSY_A_CORE FT
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  Pt seen 1:1 for a second [facility] administration (1st admin by Dr. Duran on 8/31/21, score of 20) and to offer some space for pt to process her experience on the unit.   Pt obtained a score of 25, which is slightly below the cutoff (26) but well above her previous score. Pt expressed anxiety about her performance, particularly in math and in drawing. Pt's increased score is due to her better performance in drawing the clock and finding similarities (gave an incorrect answer, similar to the one she gave at first admin, but then corrected herself). Pt also expressed continued belief that she was not getting any help, would deteriorate on the unit "because everyone here is nuts," and wanted to leave the unit.  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  Pt seen 1:1 for a second [facility] administration (1st admin by Dr. Duran on 8/31/21, score of 20) and to offer some space for pt to process her experience on the unit.   Pt obtained a score of 25, which is slightly below the cutoff (26) but well above her previous score. Pt expressed anxiety about her performance, particularly in math and in drawing. Pt's increased score is due to her better performance in drawing the clock and finding similarities (gave an incorrect answer, similar to the one she gave at first admin, but then corrected herself). When asked, pt remembered having taken the test 2 days prior. The reason for pt's score increase in unclear - pt reports that she put her best effort both times, and correctly thought she performed better today. Pt also expressed continued belief that she was not getting any help, would deteriorate on the unit "because everyone here is nuts," and wanted to leave the unit.  MSE: Level of Consciousness:Alert; Body Habitus:Underweight; Hygiene:Fair; Grooming:Fair; Behavior: Cooperative, Other: Anxious; Speech:Normal volume, rate, productivity, spontaneity and articulation; Eye Contact:Fair; Relatedness:Fair; Impulse Control: Good; Affect Range:Constricted; Affect Congruence:Congruent; Thought Process: Circumstantial, Perseverative; Thought Associations: Normal; Thought Content: Preoccupations, Ruminations, Suicidality; Normal; Mood: Anxious; Affect Quality: Anxious

## 2021-09-03 PROCEDURE — 99232 SBSQ HOSP IP/OBS MODERATE 35: CPT

## 2021-09-03 RX ADMIN — LITHIUM CARBONATE 300 MILLIGRAM(S): 300 TABLET, EXTENDED RELEASE ORAL at 10:27

## 2021-09-03 RX ADMIN — LITHIUM CARBONATE 300 MILLIGRAM(S): 300 TABLET, EXTENDED RELEASE ORAL at 21:34

## 2021-09-03 RX ADMIN — Medication 3 MILLIGRAM(S): at 21:34

## 2021-09-03 RX ADMIN — LAMOTRIGINE 100 MILLIGRAM(S): 25 TABLET, ORALLY DISINTEGRATING ORAL at 10:28

## 2021-09-03 RX ADMIN — LAMOTRIGINE 100 MILLIGRAM(S): 25 TABLET, ORALLY DISINTEGRATING ORAL at 21:34

## 2021-09-03 RX ADMIN — Medication 1 TABLET(S): at 10:27

## 2021-09-03 RX ADMIN — Medication 1 MILLIGRAM(S): at 21:34

## 2021-09-03 NOTE — BH INPATIENT PSYCHIATRY PROGRESS NOTE - NSBHASSESSSUMMFT_PSY_ALL_CORE
This is a 61 year-old domiciled female who is currently unemployed with financial stressors and limited social supports who has past psychiatric history of suspected borderline personality disorder, bipolar versus unipolar depression, no previous psychiatric hospitalizations, no history of suicide attempts but h/o passive SI often in context of acute stressors, h/o impulsivity (not clearly described as within distinct hypomanic episode) no h/o distinct acute jeanette or psychoses, no known family h/o BPAD, suspected completed suicide in great grandfather, who presented to ER with friend in context of worsening depressive symptoms and passive SI. Outside treaters are concerned and both share history of patient declining with worsening depressive symptoms (low mood, recently decreased appetite, weight loss, anergia, difficulty concentrating, amotivation, anhedonia, hopelessness and increased thoughts of passive SI ("well if I cannot resolve my financial issues, then I can always just kill myself...I mean...this is not getting any better."). Initial impression is patient has superimposed depression on borderline personality disorder based on history from therapists but unclear if unipolar versus bipolar. Patient worsened while on lamotrigine, lurasidone, and Lithium (300mg) which may suggest undertreated unipolar major depressive episode although may also be that patient requires higher dosing of Lithium for mood stabilization. Patient perseverative about finances and has difficulty organizing her thoughts (indecisiveness i/c/o depression) and no overt delusional content elicited on evaluation but r/o psychotic features on further diagnostic evaluation. Previous SSRI/SNRI and mood stabilizer trials reportedly ineffective but may have been when BPD pathology more prominent and no MDE during those trials. Admit to inpatient psychiatric unit for stabilization, no 1:1 indicated while on unit. Gather collateral around previous medication trials and reassess current medication regimen and if an SSRI trial warranted/potentially beneficial (versus higher dosing of Lithium to target mood stability). Pt placed 72 hour letter on 8/30/21.    ;;09/01: The patient has requested release.  At this point the patient is in need of further treatment for current symptoms and concerns:  To review need for treatment with patient and assess response.  anxious and sad; tends to discount SI and focus on milieu.  b12 folate tsh and ftp negative.  MRI to be scheduled.    ;;09/03: patient rescinded 72 hour letter; agrees to higher dose of Lithium 300mg q 12 hr. to mitigate or prevent suicidal behavor and stabilize mood.  Alert; oriented; cognition evidence for mild impairment ; speech clear; no tremor or evidence of movement impairment.

## 2021-09-03 NOTE — BH INPATIENT PSYCHIATRY PROGRESS NOTE - NSBHMETABOLIC_PSY_ALL_CORE_FT
BMI: BMI (kg/m2): 15.6 (08-29-21 @ 12:55)  HbA1c: A1C with Estimated Average Glucose Result: 5.5 % (08-31-21 @ 07:16)    Glucose:   BP: 118/63 (09-02-21 @ 17:16) (102/61 - 123/69)  Lipid Panel: Date/Time: 08-31-21 @ 07:16  Cholesterol, Serum: 211  Direct LDL: --  HDL Cholesterol, Serum: 96  Total Cholesterol/HDL Ration Measurement: --  Triglycerides, Serum: 56   BMI: BMI (kg/m2): 15.6 (08-29-21 @ 12:55)  HbA1c: A1C with Estimated Average Glucose Result: 5.5 % (08-31-21 @ 07:16)    Glucose:   BP: 120/68 (09-03-21 @ 16:24) (102/61 - 123/69)  Lipid Panel: Date/Time: 08-31-21 @ 07:16  Cholesterol, Serum: 211  Direct LDL: --  HDL Cholesterol, Serum: 96  Total Cholesterol/HDL Ration Measurement: --  Triglycerides, Serum: 56

## 2021-09-03 NOTE — BH INPATIENT PSYCHIATRY PROGRESS NOTE - NSBHFUPINTERVALHXFT_PSY_A_CORE
Alert; oriented; cognition mild impairment;  speech clear; no tremor or evidence of movement impairment.  Not endorsing  suicidal or homicidal ideation intent or plans; no mention of auditory or visual hallucinations . Thinking is congruent with affect; no peculiarities of thinking or language use.  Focused on discharge; wants to leave; however rescinded 72 hour letter yesterday.  Wants the writer to contact her doctor Dr. Liyah Hedrick [phone] ; unclear about sleep or appetite because very focused on leaving.

## 2021-09-04 PROCEDURE — 70553 MRI BRAIN STEM W/O & W/DYE: CPT | Mod: 26

## 2021-09-04 RX ADMIN — LAMOTRIGINE 100 MILLIGRAM(S): 25 TABLET, ORALLY DISINTEGRATING ORAL at 21:17

## 2021-09-04 RX ADMIN — Medication 3 MILLIGRAM(S): at 21:17

## 2021-09-04 RX ADMIN — LAMOTRIGINE 100 MILLIGRAM(S): 25 TABLET, ORALLY DISINTEGRATING ORAL at 10:07

## 2021-09-04 RX ADMIN — Medication 1 TABLET(S): at 10:07

## 2021-09-04 RX ADMIN — LITHIUM CARBONATE 300 MILLIGRAM(S): 300 TABLET, EXTENDED RELEASE ORAL at 21:16

## 2021-09-04 RX ADMIN — Medication 1 MILLIGRAM(S): at 21:17

## 2021-09-04 RX ADMIN — LITHIUM CARBONATE 300 MILLIGRAM(S): 300 TABLET, EXTENDED RELEASE ORAL at 10:07

## 2021-09-05 PROCEDURE — 99232 SBSQ HOSP IP/OBS MODERATE 35: CPT

## 2021-09-05 RX ADMIN — LAMOTRIGINE 100 MILLIGRAM(S): 25 TABLET, ORALLY DISINTEGRATING ORAL at 21:39

## 2021-09-05 RX ADMIN — LAMOTRIGINE 100 MILLIGRAM(S): 25 TABLET, ORALLY DISINTEGRATING ORAL at 10:34

## 2021-09-05 RX ADMIN — LITHIUM CARBONATE 300 MILLIGRAM(S): 300 TABLET, EXTENDED RELEASE ORAL at 21:40

## 2021-09-05 RX ADMIN — Medication 3 MILLIGRAM(S): at 21:39

## 2021-09-05 RX ADMIN — Medication 1 TABLET(S): at 10:35

## 2021-09-05 RX ADMIN — Medication 1 MILLIGRAM(S): at 21:38

## 2021-09-05 RX ADMIN — LITHIUM CARBONATE 300 MILLIGRAM(S): 300 TABLET, EXTENDED RELEASE ORAL at 10:35

## 2021-09-05 NOTE — BH INPATIENT PSYCHIATRY PROGRESS NOTE - NSBHMETABOLIC_PSY_ALL_CORE_FT
BMI: BMI (kg/m2): 15.6 (08-29-21 @ 12:55)  HbA1c: A1C with Estimated Average Glucose Result: 5.5 % (08-31-21 @ 07:16)    Glucose:   BP: 112/61 (09-05-21 @ 17:00) (112/61 - 135/74)  Lipid Panel: Date/Time: 08-31-21 @ 07:16  Cholesterol, Serum: 211  Direct LDL: --  HDL Cholesterol, Serum: 96  Total Cholesterol/HDL Ration Measurement: --  Triglycerides, Serum: 56   BMI: BMI (kg/m2): 15.6 (08-29-21 @ 12:55)  HbA1c: A1C with Estimated Average Glucose Result: 5.5 % (08-31-21 @ 07:16)    Glucose:   BP: 113/64 (09-09-21 @ 09:08) (93/59 - 126/76)  Lipid Panel: Date/Time: 08-31-21 @ 07:16  Cholesterol, Serum: 211  Direct LDL: --  HDL Cholesterol, Serum: 96  Total Cholesterol/HDL Ration Measurement: --  Triglycerides, Serum: 56

## 2021-09-05 NOTE — BH INPATIENT PSYCHIATRY PROGRESS NOTE - CURRENT MEDICATION
MEDICATIONS  (STANDING):  lamoTRIgine 100 milliGRAM(s) Oral every 12 hours  lithium 300 milliGRAM(s) Oral every 12 hours  multivitamin 1 Tablet(s) Oral daily    MEDICATIONS  (PRN):  acetaminophen   Tablet .. 650 milliGRAM(s) Oral every 6 hours PRN Mild Pain (1 - 3)  aluminum hydroxide/magnesium hydroxide/simethicone Suspension 30 milliLiter(s) Oral every 6 hours PRN Dyspepsia  LORazepam     Tablet 1 milliGRAM(s) Oral every 6 hours PRN anxiety, agitation  melatonin. 3 milliGRAM(s) Oral at bedtime PRN Insomnia  QUEtiapine 25 milliGRAM(s) Oral every 6 hours PRN acute agitation/restlessness   MEDICATIONS  (STANDING):lithium 300 mg q12h, lamictal 100 mg bid    MEDICATIONS  (PRN):

## 2021-09-05 NOTE — BH INPATIENT PSYCHIATRY PROGRESS NOTE - NSBHCHARTREVIEWVS_PSY_A_CORE FT
Vital Signs Last 24 Hrs  T(C): 37.2 (09-05-21 @ 17:00), Max: 37.2 (09-05-21 @ 17:00)  T(F): 98.9 (09-05-21 @ 17:00), Max: 98.9 (09-05-21 @ 17:00)  HR: 85 (09-05-21 @ 17:00) (84 - 85)  BP: 112/61 (09-05-21 @ 17:00) (112/61 - 135/74)  BP(mean): --  RR: 18 (09-05-21 @ 17:00) (16 - 18)  SpO2: 99% (09-05-21 @ 17:00) (97% - 99%)     Vital Signs Last 24 Hrs  T(C): --  T(F): --  HR: --  BP: --  BP(mean): --  RR: --  SpO2: --

## 2021-09-05 NOTE — BH INPATIENT PSYCHIATRY PROGRESS NOTE - PRN MEDS
MEDICATIONS  (PRN):  acetaminophen   Tablet .. 650 milliGRAM(s) Oral every 6 hours PRN Mild Pain (1 - 3)  aluminum hydroxide/magnesium hydroxide/simethicone Suspension 30 milliLiter(s) Oral every 6 hours PRN Dyspepsia  LORazepam     Tablet 1 milliGRAM(s) Oral every 6 hours PRN anxiety, agitation  melatonin. 3 milliGRAM(s) Oral at bedtime PRN Insomnia  QUEtiapine 25 milliGRAM(s) Oral every 6 hours PRN acute agitation/restlessness   MEDICATIONS  (PRN):

## 2021-09-05 NOTE — BH INPATIENT PSYCHIATRY PROGRESS NOTE - NSBHFUPINTERVALHXFT_PSY_A_CORE
Insisting on imminent discharge. Feels coming was a horrible mistake. Denies all suicidality. Understands she will need lithium level.

## 2021-09-06 RX ADMIN — LAMOTRIGINE 100 MILLIGRAM(S): 25 TABLET, ORALLY DISINTEGRATING ORAL at 10:35

## 2021-09-06 RX ADMIN — Medication 1 MILLIGRAM(S): at 21:14

## 2021-09-06 RX ADMIN — Medication 3 MILLIGRAM(S): at 21:14

## 2021-09-06 RX ADMIN — LITHIUM CARBONATE 300 MILLIGRAM(S): 300 TABLET, EXTENDED RELEASE ORAL at 10:35

## 2021-09-06 RX ADMIN — LAMOTRIGINE 100 MILLIGRAM(S): 25 TABLET, ORALLY DISINTEGRATING ORAL at 21:14

## 2021-09-06 RX ADMIN — LITHIUM CARBONATE 300 MILLIGRAM(S): 300 TABLET, EXTENDED RELEASE ORAL at 21:13

## 2021-09-06 RX ADMIN — Medication 1 TABLET(S): at 10:35

## 2021-09-07 PROCEDURE — 99232 SBSQ HOSP IP/OBS MODERATE 35: CPT

## 2021-09-07 RX ADMIN — LITHIUM CARBONATE 300 MILLIGRAM(S): 300 TABLET, EXTENDED RELEASE ORAL at 21:56

## 2021-09-07 RX ADMIN — LAMOTRIGINE 100 MILLIGRAM(S): 25 TABLET, ORALLY DISINTEGRATING ORAL at 21:57

## 2021-09-07 RX ADMIN — Medication 1 MILLIGRAM(S): at 21:56

## 2021-09-07 RX ADMIN — LITHIUM CARBONATE 300 MILLIGRAM(S): 300 TABLET, EXTENDED RELEASE ORAL at 10:40

## 2021-09-07 RX ADMIN — Medication 3 MILLIGRAM(S): at 21:56

## 2021-09-07 RX ADMIN — LAMOTRIGINE 100 MILLIGRAM(S): 25 TABLET, ORALLY DISINTEGRATING ORAL at 10:40

## 2021-09-07 RX ADMIN — Medication 1 TABLET(S): at 10:45

## 2021-09-07 NOTE — BH INPATIENT PSYCHIATRY PROGRESS NOTE - NSBHMETABOLIC_PSY_ALL_CORE_FT
BMI: BMI (kg/m2): 15.6 (08-29-21 @ 12:55)  HbA1c: A1C with Estimated Average Glucose Result: 5.5 % (08-31-21 @ 07:16)    Glucose:   BP: 113/56 (09-07-21 @ 08:20) (111/71 - 143/71)  Lipid Panel: Date/Time: 08-31-21 @ 07:16  Cholesterol, Serum: 211  Direct LDL: --  HDL Cholesterol, Serum: 96  Total Cholesterol/HDL Ration Measurement: --  Triglycerides, Serum: 56   BMI: BMI (kg/m2): 15.6 (08-29-21 @ 12:55)  HbA1c: A1C with Estimated Average Glucose Result: 5.5 % (08-31-21 @ 07:16)    Glucose:   BP: 113/64 (09-09-21 @ 09:08) (93/59 - 126/76)  Lipid Panel: Date/Time: 08-31-21 @ 07:16  Cholesterol, Serum: 211  Direct LDL: --  HDL Cholesterol, Serum: 96  Total Cholesterol/HDL Ration Measurement: --  Triglycerides, Serum: 56

## 2021-09-07 NOTE — BH INPATIENT PSYCHIATRY PROGRESS NOTE - NSBHFUPINTERVALHXFT_PSY_A_CORE
Provided information on results of brain MRI (wnl) and labs. Pt very irritable, worried about cost of hospitalization, devaluing of treatment here. On the other hand, pt has no SI/HI/AH/VH/PI.

## 2021-09-07 NOTE — BH INPATIENT PSYCHIATRY PROGRESS NOTE - NSBHASSESSSUMMFT_PSY_ALL_CORE
[phone]       Chief Complaint   Patient presents with    Vaginal Bleeding     std exposure       Nurses Notes reviewed and I agree except as noted in the HPI. HISTORY OF PRESENT ILLNESS   Connie Garcia Favorite is a 19 y.o. female who presents with vaginal bleeding      IChe MD,  personally performed and participated in key or critical portions of the evaluation and management including personally performing the exam and medical decision making. I verify the accuracy of the documentation by the resident. Please review resident note for specifics and further details of this urgent care visit.      Electronically signed by Ashley Medley MD on 7/21/2021 at 7:27 PM     Ashley Medley MD  07/21/21 [id number] This is a 61 year-old domiciled female who is currently unemployed with financial stressors and limited social supports who has past psychiatric history of suspected borderline personality disorder, bipolar versus unipolar depression, no previous psychiatric hospitalizations, no history of suicide attempts but h/o passive SI often in context of acute stressors, h/o impulsivity (not clearly described as within distinct hypomanic episode) no h/o distinct acute jeanette or psychoses, no known family h/o BPAD, suspected completed suicide in great grandfather, who presented to ER with friend in context of worsening depressive symptoms and passive SI. Outside treaters are concerned and both share history of patient declining with worsening depressive symptoms (low mood, recently decreased appetite, weight loss, anergia, difficulty concentrating, amotivation, anhedonia, hopelessness and increased thoughts of passive SI ("well if I cannot resolve my financial issues, then I can always just kill myself...I mean...this is not getting any better."). Initial impression is patient has superimposed depression on borderline personality disorder based on history from therapists but unclear if unipolar versus bipolar. Patient worsened while on lamotrigine, lurasidone, and Lithium (300mg) which may suggest undertreated unipolar major depressive episode although may also be that patient requires higher dosing of Lithium for mood stabilization. Patient perseverative about finances and has difficulty organizing her thoughts (indecisiveness i/c/o depression) and no overt delusional content elicited on evaluation but r/o psychotic features on further diagnostic evaluation. Previous SSRI/SNRI and mood stabilizer trials reportedly ineffective but may have been when BPD pathology more prominent and no MDE during those trials. Admit to inpatient psychiatric unit for stabilization, no 1:1 indicated while on unit. Gather collateral around previous medication trials and reassess current medication regimen and if an SSRI trial warranted/potentially beneficial (versus higher dosing of Lithium to target mood stability). Pt placed 72 hour letter on 8/30/21.    ;;09/01: The patient has requested release.  At this point the patient is in need of further treatment for current symptoms and concerns:  To review need for treatment with patient and assess response.  anxious and sad; tends to discount SI and focus on milieu.  b12 folate tsh and ftp negative.  MRI to be scheduled.    ;;09/03: patient rescinded 72 hour letter; agrees to higher dose of Lithium 300mg q 12 hr. to mitigate or prevent suicidal behavor and stabilize mood.  Alert; oriented; cognition evidence for mild impairment ; speech clear; no tremor or evidence of movement impairment.

## 2021-09-07 NOTE — BH INPATIENT PSYCHIATRY PROGRESS NOTE - CURRENT MEDICATION
MEDICATIONS  (STANDING):  lamoTRIgine 100 milliGRAM(s) Oral every 12 hours  lithium 300 milliGRAM(s) Oral every 12 hours  multivitamin 1 Tablet(s) Oral daily    MEDICATIONS  (PRN):  acetaminophen   Tablet .. 650 milliGRAM(s) Oral every 6 hours PRN Mild Pain (1 - 3)  aluminum hydroxide/magnesium hydroxide/simethicone Suspension 30 milliLiter(s) Oral every 6 hours PRN Dyspepsia  melatonin. 3 milliGRAM(s) Oral at bedtime PRN Insomnia  QUEtiapine 25 milliGRAM(s) Oral every 6 hours PRN acute agitation/restlessness   MEDICATIONS  (STANDING): lithium 300 mg q12h, lamictal 100 mg bid    MEDICATIONS  (PRN):

## 2021-09-07 NOTE — BH INPATIENT PSYCHIATRY PROGRESS NOTE - NSBHCHARTREVIEWVS_PSY_A_CORE FT
Vital Signs Last 24 Hrs  T(C): 36.9 (09-07-21 @ 08:20), Max: 36.9 (09-07-21 @ 08:20)  T(F): 98.5 (09-07-21 @ 08:20), Max: 98.5 (09-07-21 @ 08:20)  HR: 80 (09-07-21 @ 08:20) (80 - 80)  BP: 113/56 (09-07-21 @ 08:20) (113/56 - 113/56)  BP(mean): --  RR: 18 (09-07-21 @ 08:20) (18 - 18)  SpO2: 100% (09-07-21 @ 08:20) (100% - 100%)     Vital Signs Last 24 Hrs  T(C): --  T(F): --  HR: --  BP: --  BP(mean): --  RR: --  SpO2: --

## 2021-09-07 NOTE — BH INPATIENT PSYCHIATRY PROGRESS NOTE - PRN MEDS
MEDICATIONS  (PRN):  acetaminophen   Tablet .. 650 milliGRAM(s) Oral every 6 hours PRN Mild Pain (1 - 3)  aluminum hydroxide/magnesium hydroxide/simethicone Suspension 30 milliLiter(s) Oral every 6 hours PRN Dyspepsia  melatonin. 3 milliGRAM(s) Oral at bedtime PRN Insomnia  QUEtiapine 25 milliGRAM(s) Oral every 6 hours PRN acute agitation/restlessness   MEDICATIONS  (PRN):

## 2021-09-08 LAB
ALBUMIN SERPL ELPH-MCNC: 3.9 G/DL — SIGNIFICANT CHANGE UP (ref 3.3–5)
ALP SERPL-CCNC: 27 U/L — LOW (ref 40–120)
ALT FLD-CCNC: 13 U/L — SIGNIFICANT CHANGE UP (ref 10–45)
ANION GAP SERPL CALC-SCNC: 5 MMOL/L — SIGNIFICANT CHANGE UP (ref 5–17)
AST SERPL-CCNC: 15 U/L — SIGNIFICANT CHANGE UP (ref 10–40)
BILIRUB SERPL-MCNC: 0.2 MG/DL — SIGNIFICANT CHANGE UP (ref 0.2–1.2)
BUN SERPL-MCNC: 17 MG/DL — SIGNIFICANT CHANGE UP (ref 7–23)
CALCIUM SERPL-MCNC: 9.3 MG/DL — SIGNIFICANT CHANGE UP (ref 8.4–10.5)
CHLORIDE SERPL-SCNC: 114 MMOL/L — HIGH (ref 96–108)
CO2 SERPL-SCNC: 28 MMOL/L — SIGNIFICANT CHANGE UP (ref 22–31)
CREAT SERPL-MCNC: 0.81 MG/DL — SIGNIFICANT CHANGE UP (ref 0.5–1.3)
GLUCOSE SERPL-MCNC: 101 MG/DL — HIGH (ref 70–99)
HCT VFR BLD CALC: 38.4 % — SIGNIFICANT CHANGE UP (ref 34.5–45)
HGB BLD-MCNC: 12.3 G/DL — SIGNIFICANT CHANGE UP (ref 11.5–15.5)
LITHIUM SERPL-MCNC: 0.83 MMOL/L — SIGNIFICANT CHANGE UP (ref 0.6–1.2)
MCHC RBC-ENTMCNC: 31.5 PG — SIGNIFICANT CHANGE UP (ref 27–34)
MCHC RBC-ENTMCNC: 32 GM/DL — SIGNIFICANT CHANGE UP (ref 32–36)
MCV RBC AUTO: 98.2 FL — SIGNIFICANT CHANGE UP (ref 80–100)
NRBC # BLD: 0 /100 WBCS — SIGNIFICANT CHANGE UP (ref 0–0)
PLATELET # BLD AUTO: 267 K/UL — SIGNIFICANT CHANGE UP (ref 150–400)
POTASSIUM SERPL-MCNC: 4.8 MMOL/L — SIGNIFICANT CHANGE UP (ref 3.5–5.3)
POTASSIUM SERPL-SCNC: 4.8 MMOL/L — SIGNIFICANT CHANGE UP (ref 3.5–5.3)
PROT SERPL-MCNC: 5.9 G/DL — LOW (ref 6–8.3)
RBC # BLD: 3.91 M/UL — SIGNIFICANT CHANGE UP (ref 3.8–5.2)
RBC # FLD: 13.1 % — SIGNIFICANT CHANGE UP (ref 10.3–14.5)
SODIUM SERPL-SCNC: 147 MMOL/L — HIGH (ref 135–145)
WBC # BLD: 6.85 K/UL — SIGNIFICANT CHANGE UP (ref 3.8–10.5)
WBC # FLD AUTO: 6.85 K/UL — SIGNIFICANT CHANGE UP (ref 3.8–10.5)

## 2021-09-08 PROCEDURE — 99232 SBSQ HOSP IP/OBS MODERATE 35: CPT

## 2021-09-08 RX ADMIN — LAMOTRIGINE 100 MILLIGRAM(S): 25 TABLET, ORALLY DISINTEGRATING ORAL at 21:43

## 2021-09-08 RX ADMIN — Medication 1 MILLIGRAM(S): at 21:42

## 2021-09-08 RX ADMIN — LITHIUM CARBONATE 300 MILLIGRAM(S): 300 TABLET, EXTENDED RELEASE ORAL at 09:55

## 2021-09-08 RX ADMIN — Medication 1 TABLET(S): at 09:56

## 2021-09-08 RX ADMIN — LAMOTRIGINE 100 MILLIGRAM(S): 25 TABLET, ORALLY DISINTEGRATING ORAL at 09:55

## 2021-09-08 RX ADMIN — Medication 3 MILLIGRAM(S): at 21:43

## 2021-09-08 RX ADMIN — QUETIAPINE FUMARATE 25 MILLIGRAM(S): 200 TABLET, FILM COATED ORAL at 02:02

## 2021-09-08 RX ADMIN — LITHIUM CARBONATE 300 MILLIGRAM(S): 300 TABLET, EXTENDED RELEASE ORAL at 21:42

## 2021-09-08 NOTE — BH INPATIENT PSYCHIATRY PROGRESS NOTE - PRN MEDS
MEDICATIONS  (PRN):  acetaminophen   Tablet .. 650 milliGRAM(s) Oral every 6 hours PRN Mild Pain (1 - 3)  aluminum hydroxide/magnesium hydroxide/simethicone Suspension 30 milliLiter(s) Oral every 6 hours PRN Dyspepsia  LORazepam     Tablet 1 milliGRAM(s) Oral every 6 hours PRN anxiety  melatonin. 3 milliGRAM(s) Oral at bedtime PRN Insomnia  QUEtiapine 25 milliGRAM(s) Oral every 6 hours PRN acute agitation/restlessness   MEDICATIONS  (PRN):

## 2021-09-08 NOTE — BH INPATIENT PSYCHIATRY PROGRESS NOTE - CURRENT MEDICATION
MEDICATIONS  (STANDING):  lamoTRIgine 100 milliGRAM(s) Oral every 12 hours  lithium 300 milliGRAM(s) Oral every 12 hours  multivitamin 1 Tablet(s) Oral daily    MEDICATIONS  (PRN):  acetaminophen   Tablet .. 650 milliGRAM(s) Oral every 6 hours PRN Mild Pain (1 - 3)  aluminum hydroxide/magnesium hydroxide/simethicone Suspension 30 milliLiter(s) Oral every 6 hours PRN Dyspepsia  LORazepam     Tablet 1 milliGRAM(s) Oral every 6 hours PRN anxiety  melatonin. 3 milliGRAM(s) Oral at bedtime PRN Insomnia  QUEtiapine 25 milliGRAM(s) Oral every 6 hours PRN acute agitation/restlessness   MEDICATIONS  (STANDING):lithium 300 mg q12h, lamictal 100 mg bid    MEDICATIONS  (PRN):

## 2021-09-08 NOTE — BH INPATIENT PSYCHIATRY DISCHARGE NOTE - OTHER PAST PSYCHIATRIC HISTORY (INCLUDE DETAILS REGARDING ONSET, COURSE OF ILLNESS, INPATIENT/OUTPATIENT TREATMENT)
No suicide attempts or hospitalizations  States that she has had a couple of impulsive periods in her life so while she denies jay jay manic or hypomanic episodes one of her doctors felt that she would better be treated as bipolar depression. States that Dr. Joshi started her on lithium and she had a consultation with Dr. Vivian Pierre at [facility] who started her on latuda

## 2021-09-08 NOTE — BH INPATIENT PSYCHIATRY PROGRESS NOTE - NSBHFUPINTERVALHXFT_PSY_A_CORE
Pt for discharge on Thursday. Pt somewhat interested in eating disorder treatment but not willing to stay longer in inpatient unit to get it set up. Pt remains upset and dismissive with me, but has well-related and warm conversation with her peers. Attending groups. Lithium level 0.8.

## 2021-09-08 NOTE — BH TREATMENT PLAN - NSTXDEPRESINTERPR_PSY_ALL_CORE
Pt will be invited and encouraged to attend all offered groups.
Pt will continue to be invited and encouraged to attend all offered self selected groups

## 2021-09-08 NOTE — BH INPATIENT PSYCHIATRY DISCHARGE NOTE - NSDCCPCAREPLAN_GEN_ALL_CORE_FT
PRINCIPAL DISCHARGE DIAGNOSIS  Diagnosis: Bipolar depression  Assessment and Plan of Treatment:       SECONDARY DISCHARGE DIAGNOSES  Diagnosis: Bipolar depression  Assessment and Plan of Treatment:

## 2021-09-08 NOTE — BH INPATIENT PSYCHIATRY PROGRESS NOTE - NSBHATTESTSEENBY_PSY_A_CORE
Attending Psychiatrist supervising NP/Trainee, meeting pt...
attending Psychiatrist without NP/Trainee

## 2021-09-08 NOTE — BH INPATIENT PSYCHIATRY DISCHARGE NOTE - NSDCPROCEDURESFT_PSY_ALL_CORE
lithium level 0.8 9/8/21 COVID-19 PCR: NotDetec (01 Sep 2021 17:32)  COVID-19 PCR: NotDetec (28 Aug 2021 22:55)    lithium level 0.8 9/8/21  EXAM: MR BRAIN WAW IC ; ;PROCEDURE DATE: 09/04/2021 ; ; ; ;INTERPRETATION: PROCEDURE: MRI Brain with and without contrast ; ;INDICATION: Cognitive decline ; ;TECHNIQUE: Sagittal T1 volumetric series with MPR, axial T2-FLAIR, T2-FFE and diffusion imaging of the brain is obtained. Following the intravenous administration of 7.5 cc Gadavist contrast material, axial T2 spin-echo imaging is obtained followed by sagittal T1 volumetric imaging with MPR provided. ; ;COMPARISON: None ; ;FINDINGS: ; ;The ventricles, cisternal spaces, and cortical sulci are normal in size and configuration. No mass effect, midline shift or extra-axial fluid collection. ; ;There is ill-defined signal abnormality seen at the posterior callosalseptal interface anterior to the splenium of the corpus callosum and superior to the vellum interpositum seen on series 401, images 18-19. This is of unclear etiology and chronicity. No diffusion restriction is present to imply recent infarct or acute encephalitis/demyelination. Similarly, no pathologic enhancement is present to indicate something more sinister with breakdown of blood-brain barrier appear. Findings could be artifactual, however seem also present on postcontrast imaging as slightly hypointense but subtle signal appreciated at the ventral splenium on series 701, image 88 and along the undersurface of the posterior callosal body on image 88; seen more laterally across the undersurface of the splenium and posterior callosal body on series 301, images 23-25. ; ;No additional abnormal signal is identified within the brain parenchyma. No suspicious cortical or deep gray nuclei are diffusion restriction to imply encephalitis. CSF sampling, however, may still be warranted for further workup. ; ;The diffusion-weighted images demonstrate no recent infarction. The susceptibility-weighted images show no parenchymal blood product deposition. There are preserved large vascular flow-voids. ; ;The postcontrast images demonstrate no pathologic brain parenchymal or otherwise abnormal intracranial enhancement. ; ;The visualized paranasal sinuses are free of mucosal disease. The mastoid air cells are well-aerated. ; ;

## 2021-09-08 NOTE — BH INPATIENT PSYCHIATRY PROGRESS NOTE - NSBHMETABOLIC_PSY_ALL_CORE_FT
BMI: BMI (kg/m2): 15.6 (08-29-21 @ 12:55)  HbA1c: A1C with Estimated Average Glucose Result: 5.5 % (08-31-21 @ 07:16)    Glucose:   BP: 117/78 (09-08-21 @ 16:56) (93/59 - 143/71)  Lipid Panel: Date/Time: 08-31-21 @ 07:16  Cholesterol, Serum: 211  Direct LDL: --  HDL Cholesterol, Serum: 96  Total Cholesterol/HDL Ration Measurement: --  Triglycerides, Serum: 56   BMI: BMI (kg/m2): 15.6 (08-29-21 @ 12:55)  HbA1c: A1C with Estimated Average Glucose Result: 5.5 % (08-31-21 @ 07:16)    Glucose:   BP: 113/64 (09-09-21 @ 09:08) (93/59 - 126/76)  Lipid Panel: Date/Time: 08-31-21 @ 07:16  Cholesterol, Serum: 211  Direct LDL: --  HDL Cholesterol, Serum: 96  Total Cholesterol/HDL Ration Measurement: --  Triglycerides, Serum: 56

## 2021-09-08 NOTE — BH TREATMENT PLAN - NSTXDEPRESINTERMD_PSY_ALL_CORE
Psychopharm management X 15 min daily with recent increase in lithium and recent discontinuation of latuda.

## 2021-09-08 NOTE — BH INPATIENT PSYCHIATRY PROGRESS NOTE - NSCGISEVERILLNESS_PSY_ALL_CORE
4 = Moderately ill – overt symptoms causing noticeable, but modest, functional impairment or distress; symptom level may warrant medication
5 = Markedly ill - intrusive symptoms that distinctly impair social/occupational function or cause intrusive levels of distress

## 2021-09-08 NOTE — BH INPATIENT PSYCHIATRY DISCHARGE NOTE - HPI (INCLUDE ILLNESS QUALITY, SEVERITY, DURATION, TIMING, CONTEXT, MODIFYING FACTORS, ASSOCIATED SIGNS AND SYMPTOMS)
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 She reports low mood, inability to enjoy herself, cannot even bring herself to watch tv, feeling increasingly hopeless about her financial situation, with low energy, 7lb weight loss in the last 3 weeks (is 4'11", 77lbs currently, BMI 15.6), increasing isolation, withdrawal from any social contacts, and worsening suicide ideation stating that if she cannot figure out her financial situation she will have to kill herself. She states that she has had some thoughts about how she would do this which come into her mind briefly but she did not clarify and states that she is too coward to try, but notes that she has been thinking more about them. She states that generally sleep has been okay though she did not sleep last night. She states that she had switched from her longer term psychiatrist/therapist Dr. Joshi to a DBT therapist/psychiatrist Dr. Foster. She states that she had been taking latuda, lamictal, and lithium, but felt overmedicated and told Dr. Foster and stopped the lithium several weeks ago. She states that she spoke to Dr. Joshi recently who recommended restarting lithium at a lower dose, but she did this once and felt too sedated so she stopped. She states that last night she forgot to take her latuda so she took it in the morning, and stated that today she felt catatonic, in that she went to [facility] with her friend and could barely engage, did not feel present, could barely think, and so friend brought her to the ED. She states that her therapist has been suggesting hospitalization for some time, as has her friend, but patient states that she has resisted because she is scared and it is embarassing, though she acknowledges that her symptoms are all worsening and outpatient treatment has not worked, so she is open to coming into the hospital at this point.    Regarding her weight, she states that she eats throughout the day but feels that she might be eating less per meal at this point, did not quantify, denies intentionally trying to lose weight, states that she feels it is due to her stress."    On the unit, pt is anxious, discharge focused, and frequently asking for reassurance. This is her first time being psychiatrically hospitalized. She describes a sense of doom about being on the inpatient setting making statements like "being here is going to make me crazy... this is going to go on my record... being here is worse than how I was feeling at home." Pt feels like she was pressured by her friend to admit herself into the hospital. She has significant ambivalence about needing higher of level of care, which had also been recommended to her by her psychiatrist and DBT therapist. She devalues the inpatient services ("the groups here are lame," "I don't have a lot of priti in the competence of the doctors here)" along with her current outpatient providers ("the DBT program is not helpful," "my psychiatrist doesn't know me very well."). In addition, she idealizes her prior psychiatrist who she is no longer able to afford and also private residential treatment programs which she also is not able to afford.    Pt continues to endorse suicidal ideation, passive in nature, due to financial stress ("If I don't have money I might as well kill myself"), but denies plan or intent. She is guarded about details of the financial stressors and only makes vague reference to issue with the IRS. She denies imminent consequences of financial stress, such as impending homelessness. Describes anhedonic and neurovegetative symptoms of depression, feeling "detached," "going through the motions," and "disappointed with life." Denies AH/VH. Denies HI. Denies FH of mental illness. She denies difficulty tending to ADLs or IADLs in the community. Reports taking care of her pet dog and managing her finances. Denies problems with memory. Reports losing job due to "my personality" and not declining performance. Reported feeling "lighter" since not having Latuda the past two days.     Collateral from pt's psychiatrist Dr. Foster: She strongly believes pt needs higher level of care for personality pathology. Private residential treatment would be most ideal; pt has interview with [facility] this week; but unclear if she can afford this level of care. She states pt developed active suicidal ideation last week when she went on vacation. She reports that the pt's prior psychiatrist was "convinced" that pt had bipolar depression with distinct episodes of jeanette (impulsive spending, elevated/irritable mood, pt reporting "feeling out of my mind"). She is supportive of stopping Latuda as effect is unclear and restarting Lithium. 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 She reports low mood, inability to enjoy herself, cannot even bring herself to watch tv, feeling increasingly hopeless about her financial situation, with low energy, 7lb weight loss in the last 3 weeks (is 4'11", 77lbs currently, BMI 15.6), increasing isolation, withdrawal from any social contacts, and worsening suicide ideation stating that if she cannot figure out her financial situation she will have to kill herself. She states that she has had some thoughts about how she would do this which come into her mind briefly but she did not clarify and states that she is too coward to try, but notes that she has been thinking more about them. She states that generally sleep has been okay though she did not sleep last night. She states that she had switched from her longer term psychiatrist/therapist Dr. Joshi to a DBT therapist/psychiatrist Dr. Foster. She states that she had been taking latuda, lamictal, and lithium, but felt overmedicated and told Dr. Foster and stopped the lithium several weeks ago. She states that she spoke to Dr. Joshi recently who recommended restarting lithium at a lower dose, but she did this once and felt too sedated so she stopped. She states that last night she forgot to take her latuda so she took it in the morning, and stated that today she felt catatonic, in that she went to [facility] with her friend and could barely engage, did not feel present, could barely think, and so friend brought her to the ED. She states that her therapist has been suggesting hospitalization for some time, as has her friend, but patient states that she has resisted because she is scared and it is embarassing, though she acknowledges that her symptoms are all worsening and outpatient treatment has not worked, so she is open to coming into the hospital at this point.    Regarding her weight, she states that she eats throughout the day but feels that she might be eating less per meal at this point, did not quantify, denies intentionally trying to lose weight, states that she feels it is due to her stress."    On the unit, pt is anxious, discharge focused, and frequently asking for reassurance. This is her first time being psychiatrically hospitalized. She describes a sense of doom about being on the inpatient setting making statements like "being here is going to make me crazy... this is going to go on my record... being here is worse than how I was feeling at home." Pt feels like she was pressured by her friend to admit herself into the hospital. She has significant ambivalence about needing higher of level of care, which had also been recommended to her by her psychiatrist and DBT therapist. She devalues the inpatient services ("the groups here are lame," "I don't have a lot of priti in the competence of the doctors here)" along with her current outpatient providers ("the DBT program is not helpful," "my psychiatrist doesn't know me very well."). In addition, she idealizes her prior psychiatrist who she is no longer able to afford and also private residential treatment programs which she also is not able to afford.    Pt continues to endorse suicidal ideation, passive in nature, due to financial stress ("If I don't have money I might as well kill myself"), but denies plan or intent. She is guarded about details of the financial stressors and only makes vague reference to issue with the IRS. She denies imminent consequences of financial stress, such as impending homelessness. Describes anhedonic and neurovegetative symptoms of depression, feeling "detached," "going through the motions," and "disappointed with life." Denies AH/VH. Denies HI. Denies FH of mental illness. She denies difficulty tending to ADLs or IADLs in the community. Reports taking care of her pet dog and managing her finances. Denies problems with memory. Reports losing job due to "my personality" and not declining performance. Reported feeling "lighter" since not having Latuda the past two days.     Collateral from pt's psychiatrist Dr. Foster: She strongly believes pt needs higher level of care for personality pathology. Private residential treatment would be most ideal; pt has interview with [facility] this week; but unclear if she can afford this level of care. She states pt developed active suicidal ideation last week when she went on vacation. She reports that the pt's prior psychiatrist was "convinced" that pt had bipolar depression with distinct episodes of jeanette (impulsive spending, elevated/irritable mood, pt reporting "feeling out of my mind"). She is supportive of stopping Latuda as effect is unclear and restarting Lithium.    Lithium uptitrated to 300 mg q12h and level at 0.8. Latuda stopped.     BMI 15 and further inpatient eating disorder treatment offered but patient did not want to continue staying as outpatient and no longer meeting criteria for inpatient hospitalization against her will.

## 2021-09-08 NOTE — BH TREATMENT PLAN - NSTXDEPRESGOALOTHER_PSY_ALL_CORE
Pt will participate in groups and milieu treatment structure of the unit
Pt will participate in groups and milieu tx structure of unit

## 2021-09-08 NOTE — BH TREATMENT PLAN - NSTXPLANTHERAPYSESSIONSFT_PSY_ALL_CORE
09-09-21  Type of therapy: Music therapy  Type of session: Group  Level of patient participation: Engaged  Duration of participation: 45 minutes  Therapy conducted by: Psych rehab  Therapy Summary: Pt. attended music therapy group, remained for the duration of the session and participated fully.  Pt. played several instruments throughout the session.  Pt. played with awareness of others and maintained an organized rhythm in her drumming.

## 2021-09-08 NOTE — BH TREATMENT PLAN - NSTXDEPRESINTERRN_PSY_ALL_CORE
patient was encouraged to participate in groups ,take meds as prescribed by MD and verbalize feelings to staff

## 2021-09-08 NOTE — BH INPATIENT PSYCHIATRY PROGRESS NOTE - NSBHADMITMEDEDUDETAILS_A_CORE FT
yes;need to maintain therapeutic Lithium level for at least five days .  
yes

## 2021-09-08 NOTE — BH INPATIENT PSYCHIATRY DISCHARGE NOTE - HOSPITAL COURSE
Lithium uptitrated to 300 mg q12h and level at 0.8. Individual and group therapy provided. Latuda stopped. BMI 15 and further intensive eating disorder treatment offered but pt did not want to continue to be in the hospital waiting for such treatment. Moreover she did not continue to meet criteria for continued involuntary inpatient stay. Team spoke with her outpatient providers inckluding Dr. Swenson.

## 2021-09-08 NOTE — BH INPATIENT PSYCHIATRY PROGRESS NOTE - NSCGIIMPROVESX_PSY_ALL_CORE
3 = Minimally improved - slightly better with little or no clinically meaningful reduction of symptoms.  Represents very little change in basic clinical status, level of care, or functional capacity.

## 2021-09-08 NOTE — BH INPATIENT PSYCHIATRY DISCHARGE NOTE - NSBHMETABOLIC_PSY_ALL_CORE_FT
BMI: BMI (kg/m2): 15.6 (08-29-21 @ 12:55)  HbA1c: A1C with Estimated Average Glucose Result: 5.5 % (08-31-21 @ 07:16)    Glucose:   BP: 117/78 (09-08-21 @ 16:56) (93/59 - 143/71)  Lipid Panel: Date/Time: 08-31-21 @ 07:16  Cholesterol, Serum: 211  Direct LDL: --  HDL Cholesterol, Serum: 96  Total Cholesterol/HDL Ration Measurement: --  Triglycerides, Serum: 56

## 2021-09-08 NOTE — BH TREATMENT PLAN - NSCMSPTSTRENGTHS_PSY_ALL_CORE
Financial stability/Intact employment/Self-reliant/Strong support system
Assertive/Expressive of emotions

## 2021-09-08 NOTE — BH INPATIENT PSYCHIATRY PROGRESS NOTE - NSICDXBHTERTIARYDX_PSY_ALL_CORE
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  

## 2021-09-08 NOTE — BH INPATIENT PSYCHIATRY PROGRESS NOTE - NSBHCHARTREVIEWVS_PSY_A_CORE FT
Vital Signs Last 24 Hrs  T(C): 37 (09-08-21 @ 16:56), Max: 37 (09-08-21 @ 16:56)  T(F): 98.6 (09-08-21 @ 16:56), Max: 98.6 (09-08-21 @ 16:56)  HR: 80 (09-08-21 @ 16:56) (76 - 86)  BP: 117/78 (09-08-21 @ 16:56) (93/59 - 126/76)  BP(mean): --  RR: 19 (09-08-21 @ 16:56) (18 - 19)  SpO2: 97% (09-08-21 @ 16:56) (97% - 99%)     Vital Signs Last 24 Hrs  T(C): --  T(F): --  HR: --  BP: --  BP(mean): --  RR: --  SpO2: --

## 2021-09-08 NOTE — BH TREATMENT PLAN - NSTXDEPRESINTERSW_PSY_ALL_CORE
Discharge planning. SW to liason with collateral contacts and family members as needed.
encourage pt to utilize positive coping skills

## 2021-09-08 NOTE — BH INPATIENT PSYCHIATRY PROGRESS NOTE - NSICDXBHPRIMARYDX_PSY_ALL_CORE
Bipolar depression   F31.9  

## 2021-09-09 VITALS
DIASTOLIC BLOOD PRESSURE: 64 MMHG | TEMPERATURE: 98 F | OXYGEN SATURATION: 98 % | SYSTOLIC BLOOD PRESSURE: 113 MMHG | HEART RATE: 80 BPM

## 2021-09-09 DIAGNOSIS — F50.9 EATING DISORDER, UNSPECIFIED: ICD-10-CM

## 2021-09-09 PROCEDURE — 80178 ASSAY OF LITHIUM: CPT

## 2021-09-09 PROCEDURE — 99285 EMERGENCY DEPT VISIT HI MDM: CPT

## 2021-09-09 PROCEDURE — 80061 LIPID PANEL: CPT

## 2021-09-09 PROCEDURE — 85027 COMPLETE CBC AUTOMATED: CPT

## 2021-09-09 PROCEDURE — U0005: CPT

## 2021-09-09 PROCEDURE — 70553 MRI BRAIN STEM W/O & W/DYE: CPT

## 2021-09-09 PROCEDURE — U0003: CPT

## 2021-09-09 PROCEDURE — 84443 ASSAY THYROID STIM HORMONE: CPT

## 2021-09-09 PROCEDURE — 36415 COLL VENOUS BLD VENIPUNCTURE: CPT

## 2021-09-09 PROCEDURE — 82607 VITAMIN B-12: CPT

## 2021-09-09 PROCEDURE — 80175 DRUG SCREEN QUAN LAMOTRIGINE: CPT

## 2021-09-09 PROCEDURE — A9585: CPT

## 2021-09-09 PROCEDURE — 80053 COMPREHEN METABOLIC PANEL: CPT

## 2021-09-09 PROCEDURE — 81001 URINALYSIS AUTO W/SCOPE: CPT

## 2021-09-09 PROCEDURE — 83036 HEMOGLOBIN GLYCOSYLATED A1C: CPT

## 2021-09-09 PROCEDURE — 93005 ELECTROCARDIOGRAM TRACING: CPT

## 2021-09-09 PROCEDURE — 99238 HOSP IP/OBS DSCHRG MGMT 30/<: CPT

## 2021-09-09 PROCEDURE — 86769 SARS-COV-2 COVID-19 ANTIBODY: CPT

## 2021-09-09 PROCEDURE — 82746 ASSAY OF FOLIC ACID SERUM: CPT

## 2021-09-09 PROCEDURE — 86780 TREPONEMA PALLIDUM: CPT

## 2021-09-09 PROCEDURE — 85025 COMPLETE CBC W/AUTO DIFF WBC: CPT

## 2021-09-09 PROCEDURE — 80307 DRUG TEST PRSMV CHEM ANLYZR: CPT

## 2021-09-09 RX ORDER — LANOLIN ALCOHOL/MO/W.PET/CERES
1 CREAM (GRAM) TOPICAL
Qty: 30 | Refills: 0
Start: 2021-09-09 | End: 2021-10-08

## 2021-09-09 RX ORDER — LANOLIN ALCOHOL/MO/W.PET/CERES
1 CREAM (GRAM) TOPICAL
Qty: 0 | Refills: 0 | DISCHARGE
Start: 2021-09-09

## 2021-09-09 RX ORDER — LAMOTRIGINE 25 MG/1
1 TABLET, ORALLY DISINTEGRATING ORAL
Qty: 0 | Refills: 0 | DISCHARGE
Start: 2021-09-09

## 2021-09-09 RX ORDER — LAMOTRIGINE 25 MG/1
1 TABLET, ORALLY DISINTEGRATING ORAL
Qty: 60 | Refills: 0
Start: 2021-09-09 | End: 2021-10-08

## 2021-09-09 RX ORDER — LITHIUM CARBONATE 300 MG/1
1 TABLET, EXTENDED RELEASE ORAL
Qty: 0 | Refills: 0 | DISCHARGE
Start: 2021-09-09

## 2021-09-09 RX ORDER — LITHIUM CARBONATE 300 MG/1
1 TABLET, EXTENDED RELEASE ORAL
Qty: 60 | Refills: 0
Start: 2021-09-09 | End: 2021-10-08

## 2021-09-09 RX ORDER — LURASIDONE HYDROCHLORIDE 40 MG/1
0 TABLET ORAL
Qty: 0 | Refills: 0 | DISCHARGE

## 2021-09-09 RX ORDER — LAMOTRIGINE 25 MG/1
0 TABLET, ORALLY DISINTEGRATING ORAL
Qty: 0 | Refills: 0 | DISCHARGE

## 2021-09-09 RX ADMIN — Medication 1 TABLET(S): at 10:01

## 2021-09-09 RX ADMIN — LAMOTRIGINE 100 MILLIGRAM(S): 25 TABLET, ORALLY DISINTEGRATING ORAL at 09:56

## 2021-09-09 RX ADMIN — LITHIUM CARBONATE 300 MILLIGRAM(S): 300 TABLET, EXTENDED RELEASE ORAL at 09:55

## 2021-09-09 NOTE — BH INPATIENT PSYCHIATRY PROGRESS NOTE - NSTXANXGOAL_PSY_ALL_CORE
Be able to perform ADLs and maintain safety despite anxiety/panic daily

## 2021-09-09 NOTE — BH INPATIENT PSYCHIATRY PROGRESS NOTE - NSTXDEPRESGOALOTHER_PSY_ALL_CORE
Pt will participate in groups and milieu tx structure of unit
pt will participate in milieu therapy
Pt will participate in groups and milieu treatment structure of the unit
Pt will participate in groups and milieu treatment structure of the unit
Pt will participate in groups and milieu tx structure of unit
Pt will participate in groups and milieu treatment structure of the unit
Pt will participate in groups and milieu tx structure of unit
Pt will participate in groups and milieu tx structure of unit

## 2021-09-09 NOTE — BH SAFETY PLAN - WARNING SIGN 1
Completed written safety plan with pt, which can be found in the chart and a copy was given to the pt.

## 2021-09-09 NOTE — BH DISCHARGE NOTE NURSING/SOCIAL WORK/PSYCH REHAB - NSDCPRGOAL_PSY_ALL_CORE
Pt. has attended groups regularly throughout her admission.  Pt. has been social with select peers and has endorsed enjoyment socializing with others.  Pt has remained with a flat affect and has continued to endorse anxiety at times but has been more expressive of her goals and what she would like to continue to work on.  Pt. has stated that she feels that treatment for her eating disorder is a priority and something that she wants to pursue after discharge.  Pt. completed a safety plan and identify coping strategies and people whom she can reach out to in a crisis.

## 2021-09-09 NOTE — BH DISCHARGE NOTE NURSING/SOCIAL WORK/PSYCH REHAB - NSBHADVANCE_PSY_ALL_CORE
Not applicable Rhombic Flap Text: The defect edges were debeveled with a #15 scalpel blade.  Given the location of the defect and the proximity to free margins a rhombic flap was deemed most appropriate.  Using a sterile surgical marker, an appropriate rhombic flap was drawn incorporating the defect.    The area thus outlined was incised deep to adipose tissue with a #15 scalpel blade.  The skin margins were undermined to an appropriate distance in all directions utilizing iris scissors.

## 2021-09-09 NOTE — BH INPATIENT PSYCHIATRY PROGRESS NOTE - NSBHCHARTREVIEWVS_PSY_A_CORE FT
Vital Signs Last 24 Hrs  T(C): 37 (09-08-21 @ 16:56), Max: 37 (09-08-21 @ 16:56)  T(F): 98.6 (09-08-21 @ 16:56), Max: 98.6 (09-08-21 @ 16:56)  HR: 80 (09-08-21 @ 16:56) (76 - 86)  BP: 117/78 (09-08-21 @ 16:56) (93/59 - 126/76)  BP(mean): --  RR: 19 (09-08-21 @ 16:56) (18 - 19)  SpO2: 97% (09-08-21 @ 16:56) (97% - 99%)

## 2021-09-09 NOTE — BH INPATIENT PSYCHIATRY PROGRESS NOTE - NSDCCRITERIA_PSY_ALL_CORE
depressive symptoms less intense, no suicidal ideation

## 2021-09-09 NOTE — BH INPATIENT PSYCHIATRY PROGRESS NOTE - PRN MEDS
MEDICATIONS  (PRN):  acetaminophen   Tablet .. 650 milliGRAM(s) Oral every 6 hours PRN Mild Pain (1 - 3)  aluminum hydroxide/magnesium hydroxide/simethicone Suspension 30 milliLiter(s) Oral every 6 hours PRN Dyspepsia  LORazepam     Tablet 1 milliGRAM(s) Oral every 6 hours PRN anxiety  melatonin. 3 milliGRAM(s) Oral at bedtime PRN Insomnia  QUEtiapine 25 milliGRAM(s) Oral every 6 hours PRN acute agitation/restlessness

## 2021-09-09 NOTE — BH INPATIENT PSYCHIATRY PROGRESS NOTE - CURRENT MEDICATION
MEDICATIONS  (STANDING):  lamoTRIgine 100 milliGRAM(s) Oral every 12 hours  lithium 300 milliGRAM(s) Oral every 12 hours  multivitamin 1 Tablet(s) Oral daily    MEDICATIONS  (PRN):  acetaminophen   Tablet .. 650 milliGRAM(s) Oral every 6 hours PRN Mild Pain (1 - 3)  aluminum hydroxide/magnesium hydroxide/simethicone Suspension 30 milliLiter(s) Oral every 6 hours PRN Dyspepsia  LORazepam     Tablet 1 milliGRAM(s) Oral every 6 hours PRN anxiety  melatonin. 3 milliGRAM(s) Oral at bedtime PRN Insomnia  QUEtiapine 25 milliGRAM(s) Oral every 6 hours PRN acute agitation/restlessness

## 2021-09-09 NOTE — BH DISCHARGE NOTE NURSING/SOCIAL WORK/PSYCH REHAB - REASON FOR REFUSAL (REFER PATIENT TO HEALTHCARE PROVIDER FOR FOLLOW-UP):
Pt states she will take it on the outside with her PMD Pt states she will take it on the outside with her PMD.

## 2021-09-09 NOTE — BH DISCHARGE NOTE NURSING/SOCIAL WORK/PSYCH REHAB - NSCDUDCCRISIS_PSY_A_CORE
[facility]  1 [phone]) [facility] ([phone])  Text "WELL" to 66627  Website: [email or web address].[email or web address]/.[facility] 1 (630) 691-CKWY (9099) Website: www.safehorizon.org/.National Suicide Prevention Lifeline 6 (226) 221-8306/.  [facility]  1 (998) LIF[facility] (884-4982)/.  [facility]  [phone] [place] [address] [phone]   Hours:  Monday through Friday from 9 AM to 3 PM [facility]  1 [phone]) [facility] ([phone])  Text "WELL" to 77672  Website: www.[email or web address]/.[facility] 1 (348) 851-TUOS (5676) Website: www.safehorizon.org/.National Suicide Prevention Lifeline 1 (221) 587-8888/.  [facility]  1 (719) LIFENET (305-8876)/.  [facility]  [phone] [place] [address] [phone]   Hours:  Monday through Friday from 9 AM to 3 PM/.  [employer]  [employer] [facility] Crisis Line  2 [phone], Option 1

## 2021-09-13 DIAGNOSIS — F41.9 ANXIETY DISORDER, UNSPECIFIED: ICD-10-CM

## 2021-09-13 DIAGNOSIS — F60.3 BORDERLINE PERSONALITY DISORDER: ICD-10-CM

## 2021-09-13 DIAGNOSIS — R45.851 SUICIDAL IDEATIONS: ICD-10-CM

## 2021-09-13 DIAGNOSIS — F32.9 MAJOR DEPRESSIVE DISORDER, SINGLE EPISODE, UNSPECIFIED: ICD-10-CM

## 2021-09-13 DIAGNOSIS — Z59.8 OTHER PROBLEMS RELATED TO HOUSING AND ECONOMIC CIRCUMSTANCES: ICD-10-CM

## 2021-09-13 DIAGNOSIS — R63.6 UNDERWEIGHT: ICD-10-CM

## 2021-09-13 DIAGNOSIS — F31.4 BIPOLAR DISORDER, CURRENT EPISODE DEPRESSED, SEVERE, WITHOUT PSYCHOTIC FEATURES: ICD-10-CM

## 2021-09-13 DIAGNOSIS — Z56.0 UNEMPLOYMENT, UNSPECIFIED: ICD-10-CM

## 2021-09-13 SDOH — ECONOMIC STABILITY - INCOME SECURITY: UNEMPLOYMENT, UNSPECIFIED: Z56.0

## 2021-09-13 SDOH — ECONOMIC STABILITY - INCOME SECURITY: OTHER PROBLEMS RELATED TO HOUSING AND ECONOMIC CIRCUMSTANCES: Z59.8

## 2022-10-27 NOTE — BH INPATIENT PSYCHIATRY PROGRESS NOTE - NSBHCHARTREVIEWVS_PSY_A_CORE FT
Respiratory Vital Signs Last 24 Hrs  T(C): 37 (09-02-21 @ 17:16), Max: 37 (09-02-21 @ 09:43)  T(F): 98.6 (09-02-21 @ 17:16), Max: 98.6 (09-02-21 @ 09:43)  HR: 77 (09-02-21 @ 17:16) (77 - 86)  BP: 118/63 (09-02-21 @ 17:16) (102/61 - 118/63)  BP(mean): --  RR: 18 (09-02-21 @ 17:16) (18 - 18)  SpO2: 98% (09-02-21 @ 17:16) (97% - 98%)     Vital Signs Last 24 Hrs  T(C): 37.2 (09-03-21 @ 16:24), Max: 37.2 (09-03-21 @ 16:24)  T(F): 98.9 (09-03-21 @ 16:24), Max: 98.9 (09-03-21 @ 16:24)  HR: 87 (09-03-21 @ 16:24) (87 - 100)  BP: 120/68 (09-03-21 @ 16:24) (120/68 - 122/70)  BP(mean): --  RR: 18 (09-03-21 @ 16:24) (18 - 18)  SpO2: 98% (09-03-21 @ 16:24) (96% - 98%)

## 2024-01-15 NOTE — ED PROVIDER NOTE - PRINCIPAL DIAGNOSIS
Nurse Triage SBAR    Is this a 2nd Level Triage? YES, LICENSED PRACTITIONER REVIEW IS REQUIRED    Situation:     Patients calls in with concerns of diarrhea for 2 days now on antibiotics.  Patient has concern of reaction to antibiotics with significant diarrhea that is not getting better.    Diarrhea for past 2 days, patient reports 6 loose stools today and approximately 8 loose stools yesterday.   Patient reports normal oral intake with food and fluids.  Patient states afebrile since Saturday morning.    Patient reported a small dot of blood on toilet paper during last loose stool present after wiping approximately 1 hour ago.  Patient denies reports of blood in toilet boil.    Patient reports day 4 of augmentin for bronchoitis.  And is patient of Baldemar Morales MD at [facility]   Overall patient states she feels fine other than the diarrhea, reports normal daily activities at home.        Background:       Assessment:     31 year old with diarrhea for 2 days on antibiotics (day 4) but otherwise reports normal daily activities.      Protocol Recommended Disposition:   No disposition on file.      Recommendation:     At 2123, I paged PCP answering service to discuss patients condition and medications.    I received a call back from James Rabago. We discussed patients status and that evening dose of antibiotic will be held tonight, follow up with PCP tomorrow morning.  I discussed that patient does not appear weak or having symptoms of dehydration.  Discussed patient is drinking normal amounts of water, food intake is regular despite diarrhea for 2 days.        I gave patient a callback at 2149 and informed patient to stop taking her antibiotic tonight and seek PCP advice from the clinic tomorrow morning.   Discussed availability of nurse line overnight/ED visit if patient worsens or new symptoms develop.  Patient verbalizes plan and agrees on course of action, patient states she will not take tonight's  dose of antibiotics and will follow up with PCP tomorrow morning..  Care advice given and questions answered.        Reason for Disposition   SEVERE diarrhea (e.g., 7 or more times / day more than normal)   [1] Blood in the stool AND [2] small amount of blood (e.g., few drops or streaks on normal brown stool)  (Exception: Only on toilet paper. Reason: diarrhea can cause rectal irritation with blood on wiping.)    Additional Information   Negative: Difficult to awaken or acting confused (e.g., disoriented, slurred speech)   Negative: Shock suspected (e.g., cold/pale/clammy skin, too weak to stand, low BP, rapid pulse)   Negative: Sounds like a life-threatening emergency to the triager   Negative: Vomiting also present and worse than the diarrhea   Negative: [1] Blood in stool AND [2] without diarrhea   Negative: Diarrhea in a cancer patient who is currently (or recently) receiving chemotherapy or radiation therapy, or cancer patient who has metastatic or end-stage cancer and is receiving palliative care   Negative: SEVERE abdominal pain (e.g., excruciating)   Negative: [1] Blood in the stool AND [2] moderate or large amount of blood (e.g., any blood clots, passing blood without stool, toilet water turns red)   Negative: Black or tarry bowel movements  (Exception: Chronic-unchanged black-grey BMs AND is taking iron pills or Pepto-Bismol.)   Negative: [1] Drinking very little AND [2] dehydration suspected (e.g., no urine > 12 hours, very dry mouth, very lightheaded)    Protocols used: Diarrhea on Antibiotics-A-AH     Depressed mood

## 2024-04-03 ENCOUNTER — EMERGENCY (EMERGENCY)
Facility: HOSPITAL | Age: 64
LOS: 1 days | Discharge: HOME | End: 2024-04-03
Attending: EMERGENCY MEDICINE | Admitting: EMERGENCY MEDICINE
Payer: MEDICARE

## 2024-04-03 VITALS
DIASTOLIC BLOOD PRESSURE: 80 MMHG | HEART RATE: 90 BPM | RESPIRATION RATE: 17 BRPM | TEMPERATURE: 98 F | OXYGEN SATURATION: 99 % | SYSTOLIC BLOOD PRESSURE: 124 MMHG

## 2024-04-03 VITALS
HEART RATE: 84 BPM | RESPIRATION RATE: 18 BRPM | SYSTOLIC BLOOD PRESSURE: 132 MMHG | OXYGEN SATURATION: 100 % | DIASTOLIC BLOOD PRESSURE: 60 MMHG

## 2024-04-03 PROBLEM — F41.9 ANXIETY DISORDER, UNSPECIFIED: Chronic | Status: ACTIVE | Noted: 2021-08-29

## 2024-04-03 LAB
ALBUMIN SERPL ELPH-MCNC: 3.6 G/DL — SIGNIFICANT CHANGE UP (ref 3.4–5)
ALP SERPL-CCNC: 44 U/L — SIGNIFICANT CHANGE UP (ref 40–120)
ALT FLD-CCNC: 27 U/L — SIGNIFICANT CHANGE UP (ref 12–42)
ANION GAP SERPL CALC-SCNC: 7 MMOL/L — LOW (ref 9–16)
APTT BLD: 31.9 SEC — SIGNIFICANT CHANGE UP (ref 24.5–35.6)
AST SERPL-CCNC: 19 U/L — SIGNIFICANT CHANGE UP (ref 15–37)
BASOPHILS # BLD AUTO: 0.05 K/UL — SIGNIFICANT CHANGE UP (ref 0–0.2)
BASOPHILS NFR BLD AUTO: 0.9 % — SIGNIFICANT CHANGE UP (ref 0–2)
BILIRUB SERPL-MCNC: 0.2 MG/DL — SIGNIFICANT CHANGE UP (ref 0.2–1.2)
BUN SERPL-MCNC: 18 MG/DL — SIGNIFICANT CHANGE UP (ref 7–23)
CALCIUM SERPL-MCNC: 8.9 MG/DL — SIGNIFICANT CHANGE UP (ref 8.5–10.5)
CHLORIDE SERPL-SCNC: 103 MMOL/L — SIGNIFICANT CHANGE UP (ref 96–108)
CO2 SERPL-SCNC: 26 MMOL/L — SIGNIFICANT CHANGE UP (ref 22–31)
CREAT SERPL-MCNC: 0.76 MG/DL — SIGNIFICANT CHANGE UP (ref 0.5–1.3)
EGFR: 86 ML/MIN/1.73M2 — SIGNIFICANT CHANGE UP
EOSINOPHIL # BLD AUTO: 0.1 K/UL — SIGNIFICANT CHANGE UP (ref 0–0.5)
EOSINOPHIL NFR BLD AUTO: 1.8 % — SIGNIFICANT CHANGE UP (ref 0–6)
GLUCOSE SERPL-MCNC: 98 MG/DL — SIGNIFICANT CHANGE UP (ref 70–99)
HCT VFR BLD CALC: 42.2 % — SIGNIFICANT CHANGE UP (ref 34.5–45)
HGB BLD-MCNC: 13.8 G/DL — SIGNIFICANT CHANGE UP (ref 11.5–15.5)
IMM GRANULOCYTES NFR BLD AUTO: 0.4 % — SIGNIFICANT CHANGE UP (ref 0–0.9)
INR BLD: 0.95 — SIGNIFICANT CHANGE UP (ref 0.85–1.18)
LYMPHOCYTES # BLD AUTO: 1.53 K/UL — SIGNIFICANT CHANGE UP (ref 1–3.3)
LYMPHOCYTES # BLD AUTO: 26.9 % — SIGNIFICANT CHANGE UP (ref 13–44)
MCHC RBC-ENTMCNC: 30.9 PG — SIGNIFICANT CHANGE UP (ref 27–34)
MCHC RBC-ENTMCNC: 32.7 GM/DL — SIGNIFICANT CHANGE UP (ref 32–36)
MCV RBC AUTO: 94.4 FL — SIGNIFICANT CHANGE UP (ref 80–100)
MONOCYTES # BLD AUTO: 0.43 K/UL — SIGNIFICANT CHANGE UP (ref 0–0.9)
MONOCYTES NFR BLD AUTO: 7.6 % — SIGNIFICANT CHANGE UP (ref 2–14)
NEUTROPHILS # BLD AUTO: 3.55 K/UL — SIGNIFICANT CHANGE UP (ref 1.8–7.4)
NEUTROPHILS NFR BLD AUTO: 62.4 % — SIGNIFICANT CHANGE UP (ref 43–77)
NRBC # BLD: 0 /100 WBCS — SIGNIFICANT CHANGE UP (ref 0–0)
PLATELET # BLD AUTO: 340 K/UL — SIGNIFICANT CHANGE UP (ref 150–400)
POTASSIUM SERPL-MCNC: 4.3 MMOL/L — SIGNIFICANT CHANGE UP (ref 3.5–5.3)
POTASSIUM SERPL-SCNC: 4.3 MMOL/L — SIGNIFICANT CHANGE UP (ref 3.5–5.3)
PROT SERPL-MCNC: 7.5 G/DL — SIGNIFICANT CHANGE UP (ref 6.4–8.2)
PROTHROM AB SERPL-ACNC: 10.7 SEC — SIGNIFICANT CHANGE UP (ref 9.5–13)
RBC # BLD: 4.47 M/UL — SIGNIFICANT CHANGE UP (ref 3.8–5.2)
RBC # FLD: 11.8 % — SIGNIFICANT CHANGE UP (ref 10.3–14.5)
SODIUM SERPL-SCNC: 136 MMOL/L — SIGNIFICANT CHANGE UP (ref 132–145)
TROPONIN I, HIGH SENSITIVITY RESULT: <4 NG/L — SIGNIFICANT CHANGE UP
WBC # BLD: 5.68 K/UL — SIGNIFICANT CHANGE UP (ref 3.8–10.5)
WBC # FLD AUTO: 5.68 K/UL — SIGNIFICANT CHANGE UP (ref 3.8–10.5)

## 2024-04-03 PROCEDURE — 70496 CT ANGIOGRAPHY HEAD: CPT | Mod: 26,MC

## 2024-04-03 PROCEDURE — 99291 CRITICAL CARE FIRST HOUR: CPT

## 2024-04-03 PROCEDURE — 0042T: CPT | Mod: MC

## 2024-04-03 PROCEDURE — 70498 CT ANGIOGRAPHY NECK: CPT | Mod: 26,MC

## 2024-04-03 RX ORDER — ASPIRIN/CALCIUM CARB/MAGNESIUM 324 MG
1 TABLET ORAL
Qty: 30 | Refills: 0
Start: 2024-04-03 | End: 2024-05-02

## 2024-04-03 NOTE — ED ADULT NURSE NOTE - NS ED NOTE ABUSE RESPONSE YN
Bedside shift change report given to Robby Heredia RN (oncoming nurse) by Gaby Rodriguez RN (offgoing nurse). Report included the following information SBAR, Kardex, MAR and Recent Results. Yes

## 2024-04-03 NOTE — ED PROVIDER NOTE - CLINICAL SUMMARY MEDICAL DECISION MAKING FREE TEXT BOX
63 yo F presented to the ED for numbness in upper and lower extremity which has resolved. pt is out of the window for TNK as last known normal was midnight last night.

## 2024-04-03 NOTE — ED PROVIDER NOTE - CRITICAL CARE ATTENDING CONTRIBUTION TO CARE
63 yo F presented to the for numbness to the R sided of her body which resolved upon arrival. Patient is out of the window for TNK. labs wnl. CT scans did not demonstrate any acute pathology. Talked with telestroke who cleared patient for outpatient fu and the coordinator will call.  Patient prescribed daily ASA and dc home with strict return precautions.

## 2024-04-03 NOTE — ED ADULT NURSE NOTE - OBJECTIVE STATEMENT
Pt reports she went to sleep around midnight last night and felt fine. Woke up with right arm and right leg numbness that had resolved prior to arrival. Pt states similar thing happened 2 days ago. Pt with no focal deficits, weakness or sensory deficits. No AC use.

## 2024-04-03 NOTE — ED PROVIDER NOTE - PROGRESS NOTE DETAILS
Talked with neurology attending, Dr. Eduardo, who recommends starting patient on ASA and can follow with neurology as an outpatient.   He will help coordinate the appointment. Talked with radiology attending, Dr. Funes. No acute stroke.

## 2024-04-03 NOTE — ED PROVIDER NOTE - OBJECTIVE STATEMENT
65 yo F presents to the ED for R upper and lower extremity numbness that she noticed when she woke up this morning. Patient's last known normal was midnight last night. Pt states symptoms have resolved. No weakness or slurred speech. Pt states she had something similar last week but in only in her upper extremity. Patient went to her physician who was not concerned. No CP or SOB. Now patient is having some nausea. No fevers, chills, CP or SOB.

## 2024-04-03 NOTE — ED PROVIDER NOTE - PATIENT PORTAL LINK FT
You can access the FollowMyHealth Patient Portal offered by [facility] by registering at the following website: [email or web address]. By joining [employer]’s FollowMyHealth portal, you will also be able to view your health information using other applications (apps) compatible with our system.

## 2024-04-03 NOTE — ED ADULT TRIAGE NOTE - CHIEF COMPLAINT QUOTE
numbness to rt side since 8:30am upon waking. symptoms resolved +intense headache to back of head. denies blood thinners.  Pt MD told her to go to ER fore stroke eval. no neuro deficits. Stoke/TIA protocol initiated

## 2024-04-03 NOTE — ED PROVIDER NOTE - NSFOLLOWUPINSTRUCTIONS_ED_ALL_ED_FT
Please follow up with neurology and return to the emergency department if you develop any new numbness or weakness.

## 2024-04-05 DIAGNOSIS — R11.0 NAUSEA: ICD-10-CM

## 2024-04-05 DIAGNOSIS — R20.0 ANESTHESIA OF SKIN: ICD-10-CM

## 2024-04-18 NOTE — BH PSYCHOLOGY - GROUP THERAPY NOTE - NSPSYCHOLGRPCOGPROB_PSY_A_CORE
Declined all vaccines  
Diet controlled. declined meds.   Can take Omega 3 and Flaxeed. Information about food that can naturally lower cholesterol provided.   
See Cardio, Dr. Zapata.  Recently underwent ablation in 10/2023. Off from Flecanide.   Anticoag with Eliquis. Doing well.   
Well controlled with diet only   Check labs at next visit   
Within normal limits for age- retired no ADL issues,immunizations declined all vaccines, no depression ,cognitive as screening  Colonoscopy over due, last one in 2011 with hyperplastic polyps needed to repeat now. Pt wants to hold off on referral but will call back  Mammogram up-to-date, last 1 in 2023, repeat this year  Eye exam due, dental exam up to date every 6 months  Exercises as tolerated    No living will, information  Findings and recommendations discussed with Pt  Reviewed labs    
impaired problem solving skills

## 2024-04-30 PROBLEM — Z00.00 ENCOUNTER FOR PREVENTIVE HEALTH EXAMINATION: Status: ACTIVE | Noted: 2024-04-30

## 2024-05-16 ENCOUNTER — APPOINTMENT (OUTPATIENT)
Dept: NEUROLOGY | Facility: CLINIC | Age: 64
End: 2024-05-16

## 2024-05-16 NOTE — ASSESSMENT
[FreeTextEntry1] : Akira Patel is a 64 year old female with PMH of ...who presents for post ED after possible TIA.  Plan:

## 2024-05-16 NOTE — PHYSICAL EXAM
[FreeTextEntry1] : Alert. Fully oriented. Speech and language are intact. Cranial nerves II-XII are intact. Motor exam reveals intact strength with individual muscle testing in bilateral upper and lower extremities. Tone is normal. Reflexes are normal. Sensation is intact to light touch in distal extremities. Finger-to-nose and heel-to-shin are intact. Rapid alternating movements are normal in the upper and lower extremities. Gait is normal.

## 2024-05-16 NOTE — HISTORY OF PRESENT ILLNESS
[FreeTextEntry1] : Akira Patel is a 64 year old female with PMH of ...who presents for post ED after possible TIA.  PMH: Daily Medications: Neurological Symptoms: BP: Diet: Exercise: REMI: Family History:  HCT, CTP and CTA H/N on 4/3/24 unremarkable. Discharge medications include Aspirin.

## 2024-12-18 NOTE — BH INPATIENT PSYCHIATRY ASSESSMENT NOTE - CURRENT INTENT:
[facility]  Internal Medicine Consult     Holly Muir MRN# [id number]   Age: 66 year old YOB: 1958     Date of Admission: 12/18/2024  Date of Consult: 12/18/2024    Primary Care Provider: Tiffany King    Requesting Service: Behavioral Health - Sanford Sheehan MD  Reason for Consult: General Medical Evaluation      SUBJECTIVE   CC:   S/P left total knee arthroplasty   Assessment and Plan/Recommendations:     Holly Muir is a 66 year old female with PMHx significant for DM type II, hypertension, hyperlipidemia, severe major depressive disorder, schizoaffective disorder, insomnia, GERD, history of provoked PE. Patient presented for the above stated procedure on December 18, 2024. Internal medicine was consulted for post-operative medical management     S/P left total knee arthroplasty  Underwent aforementioned procedure by orthopedics 12/18/2024. Procedure was uncomplicated with EBL of 100 mL. Patient doing well post-op.  - Per ortho    DM type II  Most recent hemoglobin A1c of 7.7 on 12/2. Follows closely with Endocrinology and pharmacy for management. Recently had Lantus increased to 20 units daily and Mounjaro increased to 12.5 mg weekly.  Also takes Actos 45 mg daily and metformin 1000 mg twice daily.  - Decrease PTA Lantus to 15 units in the post-op period (PTA dose of 20 units)  - Continue PTA Actos   - Hold PTA Mounjaro  - Hold PTA metformin for now    - Anticipate resuming on discharge  - Medium intensity sliding scale insulin  - Blood glucose checks QID, at bedtime and 0200  - Hypoglycemia protocol    Hypertension  PTA on ramipril.  - Hold PTA ramipril for now   - Consider restarting in upcoming days pending creatinine and oral intake    Severe major depressive disorder  Schizoaffective disorder   Follows with Psychiatry as outpatient. PTA on Wellbutrin, BuSpar, Paxil, Vraylar, Gabapentin, propanolol.  - Continue PTA medications    Iron deficiency Anemia  Baseline  hemoglobin of ~11. PTA on ferrous gluconate.  - Follow CBC  - Continue PTA iron supplementation    HLD Continue PTA simvastatin.   Insomnia Continue PTA Trazodone.   Restless Legs Continue PTA Requip.   GERD Continue PTA omeprazole (sub for protonix OK while inpatient).  S/p Partial colectomy with colostomy creation in 2002  History of Provoked PE Occurred in May 2023. Was treated with Eliquis and patient is no longer on it.       KATY LaC  Internal Medicine [facility] Hospitalist  [facility]  Use Vocera     HPI:   Holly Muir is a 66 year old female with PMHx significant for DM type II, hypertension, hyperlipidemia, severe major depressive disorder, schizoaffective disorder, insomnia, GERD, history of provoked PE. Patient presented for the above stated procedure on December 18, 2024. Internal medicine was consulted for post-operative medical management. Patient feeling well post-op. Eating and drinking. Has gas in her ostomy bag and has the urge to urinate. No questions or concerns.      Past Medical History:     Past Medical History:   Diagnosis Date    Bilateral knee pain     Cataracts, both eyes 05/08/2013    Cervical cancer (H)     Chronic kidney disease, stage 3a (H) 11/17/2021    Depressive disorder 2005?    Diabetes     Diabetic retinopathy (H)     History of blood transfusion 2000    HTN     Inflammatory arthritis     Major depression     Memory loss     Nephropathy     OA (osteoarthritis) of knee 09/11/2013    Other and unspecified hyperlipidemia     Pterygium eye     Sacral nerve root injury     from surgery        Reviewed and updated in [facility].     Past Surgical History:      Past Surgical History:   Procedure Laterality Date    ARTHROPLASTY KNEE Right 10/02/2023    Procedure: ARTHROPLASTY, RIGHT KNEE, TOTAL;  Surgeon: Sanford Sheehan MD;  Location: [facility] OR    ARTHROSCOPY KNEE RT/LT      LT    BIOPSY  1975    Lumb onleft side of breast    BLEPHAROPLASTY BILATERAL Bilateral 08/16/2019     Procedure: BILATERAL UPPER LID BLEPHAROPLASTY;  Surgeon: Randolph Coko MD;  Location: [facility] OR    BREAST LUMPECTOMY, RT/LT      LT-benign     CATARACT IOL, RT/LT      COLONOSCOPY  05/10/2012    Procedure:COLONOSCOPY; screening colonoscopy; Surgeon:MILLA VEGA; Location:[facility] OR    COLONOSCOPY WITH CO2 INSUFFLATION N/A 08/02/2019    Procedure: Colonoscopy with CO2 insufflation;  Surgeon: Himanshu Hi MD;  Location: [facility] OR    COLOSTOMY  2000    COLOSTOMY      HYSTERECTOMY, PAP NO LONGER INDICATED      done in [place]-cervical cancer    IMPLANT STIMULATOR SACRAL NERVE STAGE ONE Right 03/10/2015    Procedure: IMPLANT STIMULATOR SACRAL NERVE STAGE ONE;  Surgeon: Teodora Yusuf MD;  [facility]: [facility] OR    IMPLANT STIMULATOR SACRAL NERVE STAGE TWO Right 03/31/2015    Procedure: IMPLANT STIMULATOR SACRAL NERVE STAGE TWO;  Surgeon: Teodora Yusuf MD;  Location: [facility] OR    IMPLANT STIMULATOR SACRAL NERVE STAGE TWO Right 06/22/2020    Procedure: INSERTION, SACRAL NERVE STIMULATOR, STAGE 2 (replacement of interstim battery);  Surgeon: Teodora Yusuf MD;  Location: [facility] OR    INJECT EPIDURAL TRANSFORAMINAL Right 01/20/2023    Procedure: Right Lumbar 5-Sacral 1 Transforaminal Epidural Steroid Injection with fluoroscopic guidance and contrast;  Surgeon: Kulwinder Booth MD;  Location: [facility] OR    INJECT EPIDURAL TRANSFORAMINAL Right 03/23/2023    Procedure: Right Lumbar 5-Sacral 1 Transforaminal Epidural Steroid Injection with fluoroscopic guidance and contrast.;  Surgeon: Kulwinder Booth MD;  Location: [facility] OR    PHACOEMULSIFICATION WITH STANDARD INTRAOCULAR LENS IMPLANT Left 09/26/2019    Procedure: LEFT PHACOEMULSIFICATION, CATARACT, WITH STANDARD IOL INSERTION;  Surgeon: Francesco Winn MD;  Location: [facility] OR    PHACOEMULSIFICATION WITH STANDARD INTRAOCULAR LENS IMPLANT Right 10/24/2019    Procedure: RIGHT PHACOEMULSIFICATION, CATARACT, WITH STANDARD IOL INSERTION;  Surgeon: Francesco Winn  "MD Aly;  Location: [facility] OR    REPAIR PTOSIS Bilateral 2019    SALPINGO OOPHORECTOMY,R/L/JENNIFER      Salpingo Oophorectomy, RT/LT/JENNIFER    [facility] EXCIS PTERYGIUM  10/2008    [facility]    [facility] STOMACH SURGERY PROCEDURE UNLISTED      [facility] COLONOSCOPY THRU STOMA, DIAGNOSTIC      normal per report-no records available-records destroyed         Social History:     Social History     Tobacco Use    Smoking status: Former     Current packs/day: 0.00     Types: Cigarettes     Quit date: 1974     Years since quittin.0     Passive exposure: Never    Smokeless tobacco: Never   Vaping Use    Vaping status: Never Used   Substance Use Topics    Alcohol use: Not Currently     Comment: social occasions    Drug use: No        Family History:     Family History   Problem Relation Age of Onset    Diabetes Mother         Natie    Cerebrovascular Disease Mother     Hypertension Mother     Diabetes Father     Hypertension Father     Cancer Maternal Grandfather     Cancer Paternal Grandfather     Diabetes Brother         Ayden    Diabetes Sister         Tomby    Thyroid Disease Daughter     Thyroid Disease Sister     Multiple Sclerosis Daughter     Glaucoma No family hx of     Macular Degeneration No family hx of          Allergies:     Allergies   Allergen Reactions    Morphine Sulfate Itching         Medications:   Reviewed. Please see MAR     Review of Systems:   10 point ROS of systems including Constitutional, Eyes, Respiratory, Cardiovascular, Gastroenterology, Genitourinary, Integumentary, Muscularskeletal, Psychiatric were all negative except for pertinent positives noted in my HPI.    OBJECTIVE   Physical Exam:   Vitals were reviewed  Blood pressure 101/63, pulse 73, temperature 97.9  F (36.6  C), temperature source Oral, resp. rate 16, height 1.575 m (5' 2\"), weight 83.7 kg (184 lb 8.4 oz), SpO2 98%, not currently breastfeeding.  Constitutional: awake, alert, cooperative, in no acute distress. A&Ox3   Eyes: EOM, " PERRLA. Sclera clear, conjunctiva normal. Anicteric   HENT: Normocephalic, without obvious abnormality, atraumatic.   Respiratory: No increased work of breathing  Neurologic: Cranial nerves II-XII are grossly intact.   Neuropsychiatric: General: normal, calm and normal eye contact. Normal affect       Data:        Lab Results   Component Value Date     12/07/2024     08/24/2020    Lab Results   Component Value Date    CHLORIDE 99 12/07/2024    CHLORIDE 99 01/19/2023    CHLORIDE 103 08/24/2020    Lab Results   Component Value Date    BUN 17.8 12/07/2024    BUN 15 01/19/2023    BUN 7 08/24/2020      Lab Results   Component Value Date    POTASSIUM 5.2 12/07/2024    POTASSIUM 4.5 01/19/2023    POTASSIUM 3.8 08/24/2020    Lab Results   Component Value Date    CO2 25 12/07/2024    CO2 30 01/19/2023    CO2 24 08/24/2020    Lab Results   Component Value Date    CR 0.83 12/07/2024    CR 0.68 08/24/2020        Lab Results   Component Value Date    WBC 7.2 12/02/2024    HGB 11.0 (L) 12/02/2024    HCT 33.8 (L) 12/02/2024    MCV 87 12/02/2024     12/02/2024     Lab Results   Component Value Date    WBC 7.2 12/02/2024              None known

## 2025-04-18 NOTE — BH TREATMENT PLAN - NSTXANXDATEEST_PSY_ALL_CORE
Behavioral Health Services     Treatment Plan Acknowledgement    Pratik Sutherland was involved in the treatment plan for this current admission, 4/17/2025.  Patient has seen and agrees to the Interdisciplinary Treatment Plan.  Pratik Sutherland verbalizes that he/she will work with the treatment team to meet the Interdisciplinary Treatment Plan goals.    X____________________________________    Date/Time: _____________________    Patient/Legally Authorized Representative  X____________________________________    Date/Time: _____________________    Treatment Team Member             [id number]     01-Sep-2021 08-Sep-2021

## 2025-07-07 ENCOUNTER — EMERGENCY (EMERGENCY)
Facility: HOSPITAL | Age: 65
LOS: 1 days | End: 2025-07-07
Attending: EMERGENCY MEDICINE | Admitting: EMERGENCY MEDICINE
Payer: MEDICARE

## 2025-07-07 VITALS
DIASTOLIC BLOOD PRESSURE: 69 MMHG | RESPIRATION RATE: 16 BRPM | OXYGEN SATURATION: 95 % | HEART RATE: 98 BPM | TEMPERATURE: 98 F | SYSTOLIC BLOOD PRESSURE: 144 MMHG

## 2025-07-07 LAB
ALBUMIN SERPL ELPH-MCNC: 3.5 G/DL — SIGNIFICANT CHANGE UP (ref 3.4–5)
ALP SERPL-CCNC: 71 U/L — SIGNIFICANT CHANGE UP (ref 40–120)
ALT FLD-CCNC: 27 U/L — SIGNIFICANT CHANGE UP (ref 12–42)
ANION GAP SERPL CALC-SCNC: 4 MMOL/L — LOW (ref 9–16)
APPEARANCE UR: CLEAR — SIGNIFICANT CHANGE UP
AST SERPL-CCNC: 22 U/L — SIGNIFICANT CHANGE UP (ref 15–37)
BASOPHILS # BLD AUTO: 0.05 K/UL — SIGNIFICANT CHANGE UP (ref 0–0.2)
BASOPHILS NFR BLD AUTO: 0.9 % — SIGNIFICANT CHANGE UP (ref 0–2)
BILIRUB SERPL-MCNC: 0.6 MG/DL — SIGNIFICANT CHANGE UP (ref 0.2–1.2)
BILIRUB UR-MCNC: NEGATIVE — SIGNIFICANT CHANGE UP
BUN SERPL-MCNC: 23 MG/DL — SIGNIFICANT CHANGE UP (ref 7–23)
CALCIUM SERPL-MCNC: 9.2 MG/DL — SIGNIFICANT CHANGE UP (ref 8.5–10.5)
CHLORIDE SERPL-SCNC: 108 MMOL/L — SIGNIFICANT CHANGE UP (ref 96–108)
CO2 SERPL-SCNC: 32 MMOL/L — HIGH (ref 22–31)
COLOR SPEC: YELLOW — SIGNIFICANT CHANGE UP
CREAT SERPL-MCNC: 0.68 MG/DL — SIGNIFICANT CHANGE UP (ref 0.5–1.3)
DIFF PNL FLD: NEGATIVE — SIGNIFICANT CHANGE UP
EGFR: 95 ML/MIN/1.73M2 — SIGNIFICANT CHANGE UP
EGFR: 95 ML/MIN/1.73M2 — SIGNIFICANT CHANGE UP
EOSINOPHIL # BLD AUTO: 0.14 K/UL — SIGNIFICANT CHANGE UP (ref 0–0.5)
EOSINOPHIL NFR BLD AUTO: 2.6 % — SIGNIFICANT CHANGE UP (ref 0–6)
GLUCOSE SERPL-MCNC: 102 MG/DL — HIGH (ref 70–99)
GLUCOSE UR QL: NEGATIVE MG/DL — SIGNIFICANT CHANGE UP
HCT VFR BLD CALC: 39.8 % — SIGNIFICANT CHANGE UP (ref 34.5–45)
HGB BLD-MCNC: 12.9 G/DL — SIGNIFICANT CHANGE UP (ref 11.5–15.5)
IMM GRANULOCYTES # BLD AUTO: 0.01 K/UL — SIGNIFICANT CHANGE UP (ref 0–0.07)
IMM GRANULOCYTES NFR BLD AUTO: 0.2 % — SIGNIFICANT CHANGE UP (ref 0–0.9)
KETONES UR QL: NEGATIVE MG/DL — SIGNIFICANT CHANGE UP
LEUKOCYTE ESTERASE UR-ACNC: NEGATIVE — SIGNIFICANT CHANGE UP
LIDOCAIN IGE QN: 53 U/L — SIGNIFICANT CHANGE UP (ref 16–77)
LYMPHOCYTES # BLD AUTO: 1.49 K/UL — SIGNIFICANT CHANGE UP (ref 1–3.3)
LYMPHOCYTES NFR BLD AUTO: 27.7 % — SIGNIFICANT CHANGE UP (ref 13–44)
MCHC RBC-ENTMCNC: 30.3 PG — SIGNIFICANT CHANGE UP (ref 27–34)
MCHC RBC-ENTMCNC: 32.4 G/DL — SIGNIFICANT CHANGE UP (ref 32–36)
MCV RBC AUTO: 93.4 FL — SIGNIFICANT CHANGE UP (ref 80–100)
MONOCYTES # BLD AUTO: 0.43 K/UL — SIGNIFICANT CHANGE UP (ref 0–0.9)
MONOCYTES NFR BLD AUTO: 8 % — SIGNIFICANT CHANGE UP (ref 2–14)
NEUTROPHILS # BLD AUTO: 3.26 K/UL — SIGNIFICANT CHANGE UP (ref 1.8–7.4)
NEUTROPHILS NFR BLD AUTO: 60.6 % — SIGNIFICANT CHANGE UP (ref 43–77)
NITRITE UR-MCNC: NEGATIVE — SIGNIFICANT CHANGE UP
NRBC # BLD AUTO: 0 K/UL — SIGNIFICANT CHANGE UP (ref 0–0)
NRBC # FLD: 0 K/UL — SIGNIFICANT CHANGE UP (ref 0–0)
NRBC BLD AUTO-RTO: 0 /100 WBCS — SIGNIFICANT CHANGE UP (ref 0–0)
PH UR: 6.5 — SIGNIFICANT CHANGE UP (ref 5–8)
PLATELET # BLD AUTO: 272 K/UL — SIGNIFICANT CHANGE UP (ref 150–400)
PMV BLD: 9.3 FL — SIGNIFICANT CHANGE UP (ref 7–13)
POTASSIUM SERPL-MCNC: 4.1 MMOL/L — SIGNIFICANT CHANGE UP (ref 3.5–5.3)
POTASSIUM SERPL-SCNC: 4.1 MMOL/L — SIGNIFICANT CHANGE UP (ref 3.5–5.3)
PROT SERPL-MCNC: 6.8 G/DL — SIGNIFICANT CHANGE UP (ref 6.4–8.2)
PROT UR-MCNC: NEGATIVE MG/DL — SIGNIFICANT CHANGE UP
RBC # BLD: 4.26 M/UL — SIGNIFICANT CHANGE UP (ref 3.8–5.2)
RBC # FLD: 11.8 % — SIGNIFICANT CHANGE UP (ref 10.3–14.5)
SODIUM SERPL-SCNC: 144 MMOL/L — SIGNIFICANT CHANGE UP (ref 132–145)
SP GR SPEC: 1.02 — SIGNIFICANT CHANGE UP (ref 1–1.03)
UROBILINOGEN FLD QL: 0.2 MG/DL — SIGNIFICANT CHANGE UP (ref 0.2–1)
WBC # BLD: 5.38 K/UL — SIGNIFICANT CHANGE UP (ref 3.8–10.5)
WBC # FLD AUTO: 5.38 K/UL — SIGNIFICANT CHANGE UP (ref 3.8–10.5)

## 2025-07-07 PROCEDURE — 99284 EMERGENCY DEPT VISIT MOD MDM: CPT | Mod: 25

## 2025-07-07 PROCEDURE — 71046 X-RAY EXAM CHEST 2 VIEWS: CPT | Mod: 26

## 2025-07-07 RX ORDER — KETOROLAC TROMETHAMINE 30 MG/ML
30 INJECTION, SOLUTION INTRAMUSCULAR; INTRAVENOUS ONCE
Refills: 0 | Status: DISCONTINUED | OUTPATIENT
Start: 2025-07-07 | End: 2025-07-07

## 2025-07-07 RX ORDER — LIDOCAINE HYDROCHLORIDE 20 MG/ML
1 JELLY TOPICAL ONCE
Refills: 0 | Status: COMPLETED | OUTPATIENT
Start: 2025-07-07 | End: 2025-07-07

## 2025-07-07 RX ADMIN — KETOROLAC TROMETHAMINE 30 MILLIGRAM(S): 30 INJECTION, SOLUTION INTRAMUSCULAR; INTRAVENOUS at 09:02

## 2025-07-07 RX ADMIN — LIDOCAINE HYDROCHLORIDE 1 PATCH: 20 JELLY TOPICAL at 09:02

## 2025-07-07 RX ADMIN — Medication 1000 MILLILITER(S): at 09:10

## 2025-07-07 NOTE — ED PROVIDER NOTE - ATTENDING APP SHARED VISIT CONTRIBUTION OF CARE
I have seen the pt, reviewed all pertinent clinical data, and I agree with the documentation/care/plan executed by IMTIAZ Devries.

## 2025-07-07 NOTE — ED ADULT TRIAGE NOTE - CHIEF COMPLAINT QUOTE
Pt. walk in for R. mid back pain x 3 days and R. sided numbness that started this morning. Pt. has had intermittent R. sided numbness since last year.

## 2025-07-07 NOTE — ED PROVIDER NOTE - NSFOLLOWUPINSTRUCTIONS_ED_ALL_ED_FT
Follow-up with your neurologist as scheduled.    Musculoskeletal Pain  Musculoskeletal pain refers to aches and pains in your bones, joints, muscles, and the tissues that surround them. This pain can occur in any part of the body. It can last for a short time (acute) or a long time (chronic).    A physical exam, lab tests, and imaging studies may be done to find the cause of your musculoskeletal pain.    Follow these instructions at home:  Lifestyle    Try to control or lower your stress levels. Stress increases muscle tension and can worsen musculoskeletal pain. It is important to recognize when you are anxious or stressed and learn ways to manage it. This may include:  Meditation or yoga.  Cognitive or behavioral therapy.  Acupuncture or massage therapy.  You may continue all activities unless the activities cause more pain. When the pain gets better, slowly resume your normal activities. Gradually increase the intensity and duration of your activities or exercise.  Managing pain, stiffness, and swelling        Treatment may include medicines for pain and inflammation that are taken by mouth or applied to the skin. Take over-the-counter and prescription medicines only as told by your health care provider.  When your pain is severe, bed rest may be helpful. Lie or sit in any position that is comfortable, but get out of bed and walk around at least every couple of hours.  If directed, apply heat to the affected area as often as told by your health care provider. Use the heat source that your health care provider recommends, such as a moist heat pack or a heating pad.  Place a towel between your skin and the heat source.  Leave the heat on for 20–30 minutes.  Remove the heat if your skin turns bright red. This is especially important if you are unable to feel pain, heat, or cold. You may have a greater risk of getting burned.  If directed, put ice on the painful area. To do this:  Put ice in a plastic bag.  Place a towel between your skin and the bag.  Leave the ice on for 20 minutes, 2–3 times a day.  Remove the ice if your skin turns bright red. This is very important. If you cannot feel pain, heat, or cold, you have a greater risk of damage to the area.  General instructions    Your health care provider may recommend that you see a physical therapist. This person can help you come up with a safe exercise program.  If told by your health care provider, do physical therapy exercises to improve movement and strength in the affected area.  Keep all follow-up visits. This is important. This includes any physical therapy visits.  Contact a health care provider if:  Your pain gets worse.  Medicines do not help ease your pain.  You cannot use the part of your body that hurts, such as your arm, leg, or neck.  You have trouble sleeping.  You have trouble doing your normal activities.  Get help right away if:  You have a new injury and your pain is worse or different.  You feel numb or you have tingling in the painful area.  Summary  Musculoskeletal pain refers to aches and pains in your bones, joints, muscles, and the tissues that surround them.  This pain can occur in any part of the body.  Your health care provider may recommend that you see a physical therapist. This person can help you come up with a safe exercise program. Do any exercises as told by your physical therapist.  Lower your stress level. Stress can worsen musculoskeletal pain. Ways to lower stress may include meditation, yoga, cognitive or behavioral therapy, acupuncture, and massage therapy.

## 2025-07-07 NOTE — ED PROVIDER NOTE - PATIENT PORTAL LINK FT
You can access the FollowMyHealth Patient Portal offered by [facility] by registering at the following website: [email or web address]. By joining [employer]’s FollowMyHealth portal, you will also be able to view your health information using other applications (apps) compatible with our system.

## 2025-07-07 NOTE — ED PROVIDER NOTE - PHYSICAL EXAMINATION
Vital Signs: I have reviewed the initial vital signs.  Constitutional: appears stated age, no acute distress  Eyes: Sclera clear, EOMI.  Cardiovascular: S1 and S2, regular rate, regular rhythm, well-perfused extremities, radial pulses equal and 2+, pedal pulses 2+ and equal  Respiratory: unlabored respiratory effort, clear to auscultation bilaterally no wheezing, rales, or rhonchi  Gastrointestinal:  abdomen soft, non-tender  Musculoskeletal: supple neck, no lower extremity edema, no midline spinal tenderness to palpation, no CVA tenderness bilaterally, +  right posterolateral rib tenderness to palpation   Integumentary: warm, dry, no rash  NEURO: AAOx3, following commands, no facial droop, no gaze palsy, no visual loss, no drift in B/L UEs and B/L LEs, normal finger-to-nose bilaterally, no sensory loss, no aphasia, no extinction/inattention/neglect

## 2025-07-07 NOTE — ED ADULT TRIAGE NOTE - MEANS OF ARRIVAL

## 2025-07-07 NOTE — ED PROVIDER NOTE - OBJECTIVE STATEMENT
65-year-old female with past medical history of prediabetes, depression presents with complaints of right posterior lateral rib pain and paresthesias.  Patient reports over the past 1 to 2 years she has had intermittent paresthesias to the right side of her body.  States she has had workup performed by neurologist outpatient.  States yesterday she felt right posterolateral rib pain concomitant with paresthesias to the right side of her body, and felt this feeling again today, prompting emergency department evaluation.  Denies fall/trauma, focal weakness/numbness, lightheadedness/dizziness, change in bowel/bladder habits, gait disturbance, urinary or bowel incontinence/retention, rash, fever/chills, cough, chest pain, shortness of breath.

## 2025-07-07 NOTE — ED PROVIDER NOTE - CLINICAL SUMMARY MEDICAL DECISION MAKING FREE TEXT BOX
65-year-old female with past medical history of prediabetes, depression presents with complaints of right posterior lateral rib pain and paresthesias.  Patient reports over the past 1 to 2 years she has had intermittent paresthesias to the right side of her body.  States she has had workup performed by neurologist outpatient.  States yesterday she felt right posterolateral rib pain concomitant with paresthesias to the right side of her body, and felt this feeling again today, prompting emergency department evaluation.  Denies fall/trauma, focal weakness/numbness, lightheadedness/dizziness, change in bowel/bladder habits, gait disturbance, urinary or bowel incontinence/retention, rash, fever/chills, cough, chest pain, shortness of breath.  Exam as noted.  plan: xray chest, labs, urinalysis.

## 2025-07-09 DIAGNOSIS — F32.A DEPRESSION, UNSPECIFIED: ICD-10-CM

## 2025-07-09 DIAGNOSIS — R07.89 OTHER CHEST PAIN: ICD-10-CM

## 2025-07-09 DIAGNOSIS — R20.2 PARESTHESIA OF SKIN: ICD-10-CM
